# Patient Record
Sex: MALE | Race: WHITE | NOT HISPANIC OR LATINO
[De-identification: names, ages, dates, MRNs, and addresses within clinical notes are randomized per-mention and may not be internally consistent; named-entity substitution may affect disease eponyms.]

---

## 2017-03-06 ENCOUNTER — APPOINTMENT (OUTPATIENT)
Dept: SURGERY | Facility: CLINIC | Age: 57
End: 2017-03-06

## 2017-03-06 VITALS
SYSTOLIC BLOOD PRESSURE: 146 MMHG | RESPIRATION RATE: 16 BRPM | HEART RATE: 76 BPM | OXYGEN SATURATION: 98 % | BODY MASS INDEX: 33.47 KG/M2 | WEIGHT: 226 LBS | TEMPERATURE: 98 F | HEIGHT: 69 IN | DIASTOLIC BLOOD PRESSURE: 98 MMHG

## 2017-03-06 DIAGNOSIS — Z83.3 FAMILY HISTORY OF DIABETES MELLITUS: ICD-10-CM

## 2017-03-06 DIAGNOSIS — E66.9 OBESITY, UNSPECIFIED: ICD-10-CM

## 2017-03-06 DIAGNOSIS — Z82.49 FAMILY HISTORY OF ISCHEMIC HEART DISEASE AND OTHER DISEASES OF THE CIRCULATORY SYSTEM: ICD-10-CM

## 2017-03-06 DIAGNOSIS — Z87.442 PERSONAL HISTORY OF URINARY CALCULI: ICD-10-CM

## 2017-03-06 DIAGNOSIS — R63.5 ABNORMAL WEIGHT GAIN: ICD-10-CM

## 2017-03-06 RX ORDER — RIVAROXABAN 10 MG/1
10 TABLET, FILM COATED ORAL
Refills: 0 | Status: ACTIVE | COMMUNITY

## 2017-03-06 RX ORDER — MULTIVIT-MINS/IRON/FOLIC/LYCOP 8-200-600
TABLET ORAL
Refills: 0 | Status: ACTIVE | COMMUNITY

## 2017-03-06 RX ORDER — AZILSARTAN KAMEDOXOMIL 40 MG/1
40 TABLET ORAL
Refills: 0 | Status: ACTIVE | COMMUNITY

## 2018-02-26 ENCOUNTER — APPOINTMENT (OUTPATIENT)
Dept: ORTHOPEDIC SURGERY | Facility: CLINIC | Age: 58
End: 2018-02-26
Payer: COMMERCIAL

## 2018-02-26 VITALS
WEIGHT: 220 LBS | DIASTOLIC BLOOD PRESSURE: 85 MMHG | HEIGHT: 69 IN | SYSTOLIC BLOOD PRESSURE: 133 MMHG | HEART RATE: 66 BPM | BODY MASS INDEX: 32.58 KG/M2

## 2018-02-26 DIAGNOSIS — M75.82 OTHER SHOULDER LESIONS, LEFT SHOULDER: ICD-10-CM

## 2018-02-26 PROCEDURE — 20610 DRAIN/INJ JOINT/BURSA W/O US: CPT | Mod: LT

## 2018-02-26 PROCEDURE — 99244 OFF/OP CNSLTJ NEW/EST MOD 40: CPT | Mod: 25

## 2018-02-26 PROCEDURE — 73030 X-RAY EXAM OF SHOULDER: CPT | Mod: LT

## 2018-03-21 ENCOUNTER — APPOINTMENT (OUTPATIENT)
Dept: OTOLARYNGOLOGY | Facility: CLINIC | Age: 58
End: 2018-03-21

## 2020-12-23 ENCOUNTER — APPOINTMENT (OUTPATIENT)
Dept: OTOLARYNGOLOGY | Facility: CLINIC | Age: 60
End: 2020-12-23
Payer: COMMERCIAL

## 2020-12-23 VITALS
HEART RATE: 74 BPM | TEMPERATURE: 97.7 F | WEIGHT: 207 LBS | HEIGHT: 69 IN | BODY MASS INDEX: 30.66 KG/M2 | SYSTOLIC BLOOD PRESSURE: 130 MMHG | DIASTOLIC BLOOD PRESSURE: 83 MMHG

## 2020-12-23 DIAGNOSIS — H92.02 OTALGIA, LEFT EAR: ICD-10-CM

## 2020-12-23 DIAGNOSIS — H61.22 IMPACTED CERUMEN, LEFT EAR: ICD-10-CM

## 2020-12-23 DIAGNOSIS — H93.12 TINNITUS, LEFT EAR: ICD-10-CM

## 2020-12-23 DIAGNOSIS — K21.9 GASTRO-ESOPHAGEAL REFLUX DISEASE W/OUT ESOPHAGITIS: ICD-10-CM

## 2020-12-23 DIAGNOSIS — T16.2XXA FOREIGN BODY IN LEFT EAR, INITIAL ENCOUNTER: ICD-10-CM

## 2020-12-23 PROCEDURE — 69200 CLEAR OUTER EAR CANAL: CPT | Mod: LT

## 2020-12-23 PROCEDURE — 99072 ADDL SUPL MATRL&STAF TM PHE: CPT

## 2020-12-23 PROCEDURE — 99204 OFFICE O/P NEW MOD 45 MIN: CPT | Mod: 25

## 2020-12-23 PROCEDURE — 69210 REMOVE IMPACTED EAR WAX UNI: CPT | Mod: RT

## 2020-12-23 NOTE — PHYSICAL EXAM
[Midline] : trachea located in midline position [Normal] : no rashes [de-identified] : b/l wax  and left ear cotton foreign body

## 2020-12-23 NOTE — HISTORY OF PRESENT ILLNESS
[Hearing Loss] : hearing loss [No] : patient does not have a  history of radiation therapy [de-identified] : 59 yo male\par Patient complains that he went swimming last week, got water in his left ear since then has pain. States he cant hear from left ear with a constant tinnitus. Spoke with his PCP, she rx Augmentin, didn't  help with pain so she then recommended that he put a hot pack on the jaw joint. States ear pain has slightly resolved. Continues to have hearing loss and tinnitus. \par Pt has no ear pain, ear drainage, vertigo, nasal congestion, nasal discharge, epistaxis, sinus infections, facial pain, facial pressure, throat pain, dysphagia or fevers\par \par  [Anxiety] : no anxiety [Dizziness] : no dizziness [Headache] : no headache [Recurrent Otitis Media] : no recurrent otitis media [Otitis Media with Effusion] : no otitis media with effusion [Presbycusis] : no presbycusis [Congenital Ear Malformation] : no congenital ear malformation [Meniere Disease] : no Meniere disease [Otosclerosis] : no otosclerosis [Perilymphatic Fistula] : no perilymphatic fistula [Hypertension] : no hypertension [Loud Noise Exposure] : no history of loud noise exposure [Smoking] : no smoking [Early Onset Hearing Loss] : no early onset hearing loss [Stroke] : no stroke [Facial Pain] : no facial pain [Facial Pressure] : no facial pressure [Nasal Congestion] : no nasal congestion [Diplopia] : no diplopia [Ear Fullness] : no ear fullness [Allergic Rhinitis] : no allergic rhinitis [Maxillary Tooth Infection] : no maxillary tooth infection [Septal Deviation] : no septal deviation [Environmental Allergies] : no environmental allergies [Seasonal Allergies] : no seasonal allergies [Environmental Allergens] : no environmental allergens [Adenoidectomy] : no adenoidectomy [Nasal FB Removal] : no nasal foreign body removal [Allergies] : no allergies [Asthma] : no asthma [Neck Mass] : no neck mass [Neck Pain] : no neck pain [Chills] : no chills [Cold Intolerance] : no cold intolerance [Cough] : no cough [Fatigue] : no fatigue [Heat Intolerance] : no heat intolerance [Hyperthyroidism] : no hyperthyroidism [Sialadenitis] : no sialadenitis [Hodgkin Disease] : no hodgkin disease [Non-Hodgkin Lymphoma] : no non-hodgkin lymphoma [Tobacco Use] : no tobacco use [Alcohol Use] : no alcohol use [Graves Disease] : no graves disease [Thyroid Cancer] : no thyroid cancer

## 2020-12-23 NOTE — ASSESSMENT
[FreeTextEntry1] : Patient complains of left sided ear pain and hearing loss x 1 week \par Wax:\par -Cerumen is removed from the right and left  ear canal with a curette and suction.\par -Left ear cotton ball removed \par -Rx:Debrox be placed in both ears on a routine basis to keep them free of wax.\par -Routine debridement suggested to keep the ears free of wax.\par -Hearing improved with wax removal \par d/u q-tips usage\par \par f/u prn

## 2020-12-23 NOTE — PROCEDURE
[Risk and Benefits Discussed] : The purpose, risks, discomforts, benefits and alternatives of the procedure have been explained to the patient including no treatment. [Cerumen Impaction] : Cerumen Impaction [Same] : same as the Pre Op Dx. [] : Removal of Cerumen [FreeTextEntry1] : left EAC foreign body [FreeTextEntry6] : wax removed \par wax removed bilat\par Cotton ball foreign body removed from left eac\par the microscope was necessary for illumination and magnification\par

## 2024-06-05 ENCOUNTER — NON-APPOINTMENT (OUTPATIENT)
Age: 64
End: 2024-06-05

## 2024-06-10 ENCOUNTER — TRANSCRIPTION ENCOUNTER (OUTPATIENT)
Age: 64
End: 2024-06-10

## 2024-06-10 ENCOUNTER — OUTPATIENT (OUTPATIENT)
Dept: OUTPATIENT SERVICES | Facility: HOSPITAL | Age: 64
LOS: 1 days | End: 2024-06-10
Payer: COMMERCIAL

## 2024-06-10 ENCOUNTER — NON-APPOINTMENT (OUTPATIENT)
Age: 64
End: 2024-06-10

## 2024-06-10 VITALS
OXYGEN SATURATION: 98 % | SYSTOLIC BLOOD PRESSURE: 153 MMHG | HEIGHT: 69 IN | HEART RATE: 72 BPM | WEIGHT: 203.93 LBS | TEMPERATURE: 98 F | DIASTOLIC BLOOD PRESSURE: 93 MMHG | RESPIRATION RATE: 17 BRPM

## 2024-06-10 VITALS
OXYGEN SATURATION: 96 % | HEART RATE: 85 BPM | DIASTOLIC BLOOD PRESSURE: 93 MMHG | SYSTOLIC BLOOD PRESSURE: 144 MMHG | RESPIRATION RATE: 15 BRPM

## 2024-06-10 DIAGNOSIS — I48.3 TYPICAL ATRIAL FLUTTER: ICD-10-CM

## 2024-06-10 LAB
ANION GAP SERPL CALC-SCNC: 12 MMOL/L — SIGNIFICANT CHANGE UP (ref 5–17)
BUN SERPL-MCNC: 20 MG/DL — SIGNIFICANT CHANGE UP (ref 7–23)
CALCIUM SERPL-MCNC: 9.7 MG/DL — SIGNIFICANT CHANGE UP (ref 8.4–10.5)
CHLORIDE SERPL-SCNC: 108 MMOL/L — SIGNIFICANT CHANGE UP (ref 96–108)
CO2 SERPL-SCNC: 25 MMOL/L — SIGNIFICANT CHANGE UP (ref 22–31)
CREAT SERPL-MCNC: 1.43 MG/DL — HIGH (ref 0.5–1.3)
EGFR: 55 ML/MIN/1.73M2 — LOW
GLUCOSE BLDC GLUCOMTR-MCNC: 144 MG/DL — HIGH (ref 70–99)
GLUCOSE SERPL-MCNC: 152 MG/DL — HIGH (ref 70–99)
HCT VFR BLD CALC: 46.3 % — SIGNIFICANT CHANGE UP (ref 39–50)
HGB BLD-MCNC: 14 G/DL — SIGNIFICANT CHANGE UP (ref 13–17)
MCHC RBC-ENTMCNC: 25.9 PG — LOW (ref 27–34)
MCHC RBC-ENTMCNC: 30.2 GM/DL — LOW (ref 32–36)
MCV RBC AUTO: 85.7 FL — SIGNIFICANT CHANGE UP (ref 80–100)
NRBC # BLD: 0 /100 WBCS — SIGNIFICANT CHANGE UP (ref 0–0)
PLATELET # BLD AUTO: 137 K/UL — LOW (ref 150–400)
POTASSIUM SERPL-MCNC: 4.1 MMOL/L — SIGNIFICANT CHANGE UP (ref 3.5–5.3)
POTASSIUM SERPL-SCNC: 4.1 MMOL/L — SIGNIFICANT CHANGE UP (ref 3.5–5.3)
RBC # BLD: 5.4 M/UL — SIGNIFICANT CHANGE UP (ref 4.2–5.8)
RBC # FLD: 13.5 % — SIGNIFICANT CHANGE UP (ref 10.3–14.5)
SODIUM SERPL-SCNC: 145 MMOL/L — SIGNIFICANT CHANGE UP (ref 135–145)
WBC # BLD: 5.7 K/UL — SIGNIFICANT CHANGE UP (ref 3.8–10.5)
WBC # FLD AUTO: 5.7 K/UL — SIGNIFICANT CHANGE UP (ref 3.8–10.5)

## 2024-06-10 PROCEDURE — 82962 GLUCOSE BLOOD TEST: CPT

## 2024-06-10 PROCEDURE — 93005 ELECTROCARDIOGRAM TRACING: CPT

## 2024-06-10 PROCEDURE — 80048 BASIC METABOLIC PNL TOTAL CA: CPT

## 2024-06-10 PROCEDURE — 93010 ELECTROCARDIOGRAM REPORT: CPT | Mod: 76

## 2024-06-10 PROCEDURE — 36415 COLL VENOUS BLD VENIPUNCTURE: CPT

## 2024-06-10 PROCEDURE — 92960 CARDIOVERSION ELECTRIC EXT: CPT

## 2024-06-10 PROCEDURE — 85027 COMPLETE CBC AUTOMATED: CPT

## 2024-06-10 RX ORDER — SEMAGLUTIDE 0.68 MG/ML
0.5 INJECTION, SOLUTION SUBCUTANEOUS
Refills: 0 | DISCHARGE

## 2024-06-10 RX ORDER — METFORMIN HYDROCHLORIDE 850 MG/1
1 TABLET ORAL
Refills: 0 | DISCHARGE

## 2024-06-10 RX ORDER — ATORVASTATIN CALCIUM 80 MG/1
1 TABLET, FILM COATED ORAL
Refills: 0 | DISCHARGE

## 2024-06-10 RX ORDER — TAMSULOSIN HYDROCHLORIDE 0.4 MG/1
1 CAPSULE ORAL
Refills: 0 | DISCHARGE

## 2024-06-10 RX ORDER — METOPROLOL TARTRATE 50 MG
1 TABLET ORAL
Refills: 0 | DISCHARGE

## 2024-06-10 RX ORDER — RIVAROXABAN 15 MG-20MG
1 KIT ORAL
Refills: 0 | DISCHARGE

## 2024-06-10 RX ORDER — AZILSARTAN KAMEDOXOMIL AND CHLORTHALIDONE 40; 12.5 MG/1; MG/1
1 TABLET ORAL
Refills: 0 | DISCHARGE

## 2024-06-10 RX ORDER — EMPAGLIFLOZIN 10 MG/1
1 TABLET, FILM COATED ORAL
Refills: 0 | DISCHARGE

## 2024-06-10 NOTE — ASU DISCHARGE PLAN (ADULT/PEDIATRIC) - NS MD DC FALL RISK RISK
For information on Fall & Injury Prevention, visit: [email or web address] OR  [email or web address] OR  https://www.cdc.gov/steadi/patient.html

## 2024-06-10 NOTE — H&P CARDIOLOGY - HISTORY OF PRESENT ILLNESS
64 yrs Male with PMHx DMT2, HTN, Afib. Pt presents today for DCCV.  64 yrs Male with PMHx DMT2, HTN, Afib.HLD presents to [facility] Dr [facility]. Pt was put on Holter monitor which showed Afib.  Pt presents today for DCCV.  64 yrs Male with PMHx DMT2, HTN, Afib (on Xarelto last dose 6/10/24),.HLD presents to [facility] Dr [facility]. Pt was put on Holter monitor which showed Afib. Pt presents today for DCCV.

## 2024-06-10 NOTE — ASU DISCHARGE PLAN (ADULT/PEDIATRIC) - CARE PROVIDER_API CALL
Bob Silver  Cardiovascular Disease  [address] [facility] [address]  [place] [address]  Phone: [phone]  Fax: [phone]  Follow Up Time: Routine

## 2024-06-10 NOTE — H&P CARDIOLOGY - NSICDXPASTSURGICALHX_GEN_ALL_CORE_FT
PAST SURGICAL HISTORY:  Kidney calculus 2012 - left - surgical removal    S/P cataract surgery 2011 - bilateral eyes

## 2024-06-10 NOTE — PRE-ANESTHESIA EVALUATION ADULT - NSANTHPMHFT_GEN_ALL_CORE
63 yo M w/ PMHx of DMT2, HTN, Afib (on Xarelto last dose 6/10/24), HLD, SAYRA (non-compliant) presents to [facility]. Pt was put on Holter monitor which showed Afib. Pt presents today for DCCV.    Denies active CP/SOB/Orthopnea  Denies hx of MI/CHF

## 2024-06-10 NOTE — H&P CARDIOLOGY - NSICDXPASTMEDICALHX_GEN_ALL_CORE_FT
PAST MEDICAL HISTORY:  Atrial fibrillation     Diabetes mellitus     Hypertension     Hypertriglyceridemia     Nephrolithiasis     Obesity (BMI 30-39.9)     Sleep apnea

## 2024-10-23 ENCOUNTER — OUTPATIENT (OUTPATIENT)
Dept: OUTPATIENT SERVICES | Facility: HOSPITAL | Age: 64
LOS: 1 days | End: 2024-10-23
Payer: COMMERCIAL

## 2024-10-23 VITALS
TEMPERATURE: 99 F | HEART RATE: 82 BPM | DIASTOLIC BLOOD PRESSURE: 88 MMHG | OXYGEN SATURATION: 98 % | HEIGHT: 70 IN | WEIGHT: 214.95 LBS | RESPIRATION RATE: 14 BRPM | SYSTOLIC BLOOD PRESSURE: 134 MMHG

## 2024-10-23 DIAGNOSIS — Z98.890 OTHER SPECIFIED POSTPROCEDURAL STATES: Chronic | ICD-10-CM

## 2024-10-23 DIAGNOSIS — I48.91 UNSPECIFIED ATRIAL FIBRILLATION: ICD-10-CM

## 2024-10-23 DIAGNOSIS — Z90.3 ACQUIRED ABSENCE OF STOMACH [PART OF]: Chronic | ICD-10-CM

## 2024-10-23 DIAGNOSIS — Z01.818 ENCOUNTER FOR OTHER PREPROCEDURAL EXAMINATION: ICD-10-CM

## 2024-10-23 LAB
A1C WITH ESTIMATED AVERAGE GLUCOSE RESULT: 7.6 % — HIGH (ref 4–5.6)
ALBUMIN SERPL ELPH-MCNC: 4.2 G/DL — SIGNIFICANT CHANGE UP (ref 3.3–5)
ALP SERPL-CCNC: 68 U/L — SIGNIFICANT CHANGE UP (ref 40–120)
ALT FLD-CCNC: 20 U/L — SIGNIFICANT CHANGE UP (ref 10–45)
ANION GAP SERPL CALC-SCNC: 16 MMOL/L — SIGNIFICANT CHANGE UP (ref 5–17)
AST SERPL-CCNC: 17 U/L — SIGNIFICANT CHANGE UP (ref 10–40)
BILIRUB SERPL-MCNC: 0.4 MG/DL — SIGNIFICANT CHANGE UP (ref 0.2–1.2)
BLD GP AB SCN SERPL QL: NEGATIVE — SIGNIFICANT CHANGE UP
BUN SERPL-MCNC: 23 MG/DL — SIGNIFICANT CHANGE UP (ref 7–23)
CALCIUM SERPL-MCNC: 9.5 MG/DL — SIGNIFICANT CHANGE UP (ref 8.4–10.5)
CHLORIDE SERPL-SCNC: 101 MMOL/L — SIGNIFICANT CHANGE UP (ref 96–108)
CO2 SERPL-SCNC: 21 MMOL/L — LOW (ref 22–31)
CREAT SERPL-MCNC: 1.25 MG/DL — SIGNIFICANT CHANGE UP (ref 0.5–1.3)
EGFR: 65 ML/MIN/1.73M2 — SIGNIFICANT CHANGE UP
ESTIMATED AVERAGE GLUCOSE: 171 MG/DL — HIGH (ref 68–114)
GLUCOSE SERPL-MCNC: 349 MG/DL — HIGH (ref 70–99)
HCT VFR BLD CALC: 45 % — SIGNIFICANT CHANGE UP (ref 39–50)
HGB BLD-MCNC: 14.3 G/DL — SIGNIFICANT CHANGE UP (ref 13–17)
MCHC RBC-ENTMCNC: 27.8 PG — SIGNIFICANT CHANGE UP (ref 27–34)
MCHC RBC-ENTMCNC: 31.8 GM/DL — LOW (ref 32–36)
MCV RBC AUTO: 87.4 FL — SIGNIFICANT CHANGE UP (ref 80–100)
NRBC # BLD: 0 /100 WBCS — SIGNIFICANT CHANGE UP (ref 0–0)
PLATELET # BLD AUTO: 177 K/UL — SIGNIFICANT CHANGE UP (ref 150–400)
POTASSIUM SERPL-MCNC: 4 MMOL/L — SIGNIFICANT CHANGE UP (ref 3.5–5.3)
POTASSIUM SERPL-SCNC: 4 MMOL/L — SIGNIFICANT CHANGE UP (ref 3.5–5.3)
PROT SERPL-MCNC: 7.5 G/DL — SIGNIFICANT CHANGE UP (ref 6–8.3)
RBC # BLD: 5.15 M/UL — SIGNIFICANT CHANGE UP (ref 4.2–5.8)
RBC # FLD: 12.9 % — SIGNIFICANT CHANGE UP (ref 10.3–14.5)
RH IG SCN BLD-IMP: POSITIVE — SIGNIFICANT CHANGE UP
SODIUM SERPL-SCNC: 138 MMOL/L — SIGNIFICANT CHANGE UP (ref 135–145)
WBC # BLD: 5.08 K/UL — SIGNIFICANT CHANGE UP (ref 3.8–10.5)
WBC # FLD AUTO: 5.08 K/UL — SIGNIFICANT CHANGE UP (ref 3.8–10.5)

## 2024-10-23 PROCEDURE — 85027 COMPLETE CBC AUTOMATED: CPT

## 2024-10-23 PROCEDURE — G0463: CPT

## 2024-10-23 PROCEDURE — 80053 COMPREHEN METABOLIC PANEL: CPT

## 2024-10-23 PROCEDURE — 86901 BLOOD TYPING SEROLOGIC RH(D): CPT

## 2024-10-23 PROCEDURE — 83036 HEMOGLOBIN GLYCOSYLATED A1C: CPT

## 2024-10-23 PROCEDURE — 36415 COLL VENOUS BLD VENIPUNCTURE: CPT

## 2024-10-23 PROCEDURE — 86900 BLOOD TYPING SEROLOGIC ABO: CPT

## 2024-10-23 PROCEDURE — 86850 RBC ANTIBODY SCREEN: CPT

## 2024-10-23 RX ORDER — POTASSIUM CITRATE 10 MEQ/1
15 TABLET, EXTENDED RELEASE ORAL
Refills: 0 | DISCHARGE

## 2024-10-23 RX ORDER — ASPIRIN/MAG CARB/ALUMINUM AMIN 325 MG
0 TABLET ORAL
Refills: 0 | DISCHARGE

## 2024-10-23 RX ORDER — FENOFIBRATE 145 MG/1
1 TABLET, FILM COATED ORAL
Refills: 0 | DISCHARGE

## 2024-10-23 NOTE — H&P PST ADULT - NEGATIVE NEUROLOGICAL SYMPTOMS
no weakness/no paresthesias/no generalized seizures/no focal seizures/no syncope/no vertigo/no difficulty walking

## 2024-10-23 NOTE — H&P PST ADULT - PROBLEM SELECTOR PLAN 1
AF Ablation  -cbc, bmp, A1c, type and screen @ PST  -preop instructions discussed  -per redcap --no interruption in A/C  -ABO and fingerstick on admit AF Ablation  -cbc, bmp, A1c, type and screen @ PST  -preop instructions discussed  -per redcap --no interruption in A/C  -instructed to hold ozempic 1 week preop   -hold jardiance 3 days preop  -hold metformin day of procedure  -ABO and fingerstick on admit

## 2024-10-23 NOTE — H&P PST ADULT - NSICDXPASTSURGICALHX_GEN_ALL_CORE_FT
PAST SURGICAL HISTORY:  H/O circumcision     H/O colonoscopy     H/O endoscopy     H/O gastric sleeve     H/O lithotripsy     H/O right inguinal hernia repair     Kidney calculus 2012 - left - surgical removal    S/P cataract surgery 2011 - bilateral eyes

## 2024-10-23 NOTE — H&P PST ADULT - CONSTITUTIONAL
well-groomed/no distress Peng Advancement Flap Text: The defect edges were debeveled with a #15 scalpel blade.  Given the location of the defect, shape of the defect and the proximity to free margins a Peng advancement flap was deemed most appropriate.  Using a sterile surgical marker, an appropriate advancement flap was drawn incorporating the defect and placing the expected incisions within the relaxed skin tension lines where possible. The area thus outlined was incised deep to adipose tissue with a #15 scalpel blade.  The skin margins were undermined to an appropriate distance in all directions utilizing iris scissors.

## 2024-10-23 NOTE — H&P PST ADULT - HISTORY OF PRESENT ILLNESS
64 year old male PMH of HTN, T2D, Morbid Obesity, S/P Sleeve Gastrectomy in 2013, SAYRA, (states no longer uses CPAP since 50lb weight loss), and  AF diagnosed 15 years ago (on xarelto) who presents to [facility] prior to AF Ablation on 11/4/2024. Patient endorses that he underwent S/P DCCV in june, now with recurrence. He denies fever?   64 year old male PMH of HTN, T2D, Morbid Obesity, S/P Sleeve Gastrectomy in 2013, SAYRA, (states no longer uses CPAP since 50lb weight loss), and  AF diagnosed 15 years ago (on xarelto) who presents to [facility] prior to AF Ablation on 11/4/2024. Patient endorses that he underwent S/P DCCV in june, now with recurrence. He denies SOB, ROWELL, chest pain, palpitations, dizziness, lightheadedness, syncope, presyncope, fever, chills, nausea, vomiting or abdominal pain.     64 year old male PMH of HTN, T2D, Morbid Obesity, S/P Sleeve Gastrectomy in 2013, SAYRA, (states no longer uses CPAP since 50lb weight loss), and  AF diagnosed 15 years ago (on xarelto) who presents to [facility] prior to AF Ablation on 11/4/2024. Patient endorses that he underwent S/P Cardioversion in June. He denies SOB, ROWELL, chest pain, palpitations, dizziness, lightheadedness, syncope, presyncope, fever, chills, nausea, vomiting or abdominal pain.

## 2024-10-30 RX ORDER — EMPAGLIFLOZIN 10 MG/1
10 TABLET, FILM COATED ORAL
Refills: 0 | Status: ACTIVE | COMMUNITY

## 2024-10-30 RX ORDER — METOPROLOL SUCCINATE 50 MG/1
50 TABLET, EXTENDED RELEASE ORAL
Refills: 0 | Status: ACTIVE | COMMUNITY

## 2024-10-30 RX ORDER — REPAGLINIDE 1 MG/1
TABLET ORAL
Refills: 0 | Status: ACTIVE | COMMUNITY

## 2024-10-31 LAB
ANION GAP SERPL CALC-SCNC: 14 MMOL/L
BUN SERPL-MCNC: 18 MG/DL
CALCIUM SERPL-MCNC: 9.3 MG/DL
CHLORIDE SERPL-SCNC: 106 MMOL/L
CO2 SERPL-SCNC: 22 MMOL/L
CREAT SERPL-MCNC: 1.45 MG/DL
EGFR: 54 ML/MIN/1.73M2
GLUCOSE SERPL-MCNC: 167 MG/DL
POTASSIUM SERPL-SCNC: 4 MMOL/L
SODIUM SERPL-SCNC: 142 MMOL/L

## 2024-11-04 ENCOUNTER — INPATIENT (INPATIENT)
Facility: HOSPITAL | Age: 64
LOS: 0 days | Discharge: HOME | DRG: 310 | End: 2024-11-05
Attending: STUDENT IN AN ORGANIZED HEALTH CARE EDUCATION/TRAINING PROGRAM | Admitting: INTERNAL MEDICINE
Payer: COMMERCIAL

## 2024-11-04 VITALS
DIASTOLIC BLOOD PRESSURE: 96 MMHG | TEMPERATURE: 98 F | WEIGHT: 210.1 LBS | OXYGEN SATURATION: 95 % | SYSTOLIC BLOOD PRESSURE: 152 MMHG | HEIGHT: 69 IN | HEART RATE: 72 BPM | RESPIRATION RATE: 16 BRPM

## 2024-11-04 DIAGNOSIS — Z98.890 OTHER SPECIFIED POSTPROCEDURAL STATES: Chronic | ICD-10-CM

## 2024-11-04 DIAGNOSIS — I48.91 UNSPECIFIED ATRIAL FIBRILLATION: ICD-10-CM

## 2024-11-04 DIAGNOSIS — Z90.3 ACQUIRED ABSENCE OF STOMACH [PART OF]: Chronic | ICD-10-CM

## 2024-11-04 LAB
ALBUMIN SERPL ELPH-MCNC: 3.8 G/DL — SIGNIFICANT CHANGE UP (ref 3.3–5)
ALP SERPL-CCNC: 57 U/L — SIGNIFICANT CHANGE UP (ref 40–120)
ALT FLD-CCNC: 19 U/L — SIGNIFICANT CHANGE UP (ref 10–45)
ANION GAP SERPL CALC-SCNC: 13 MMOL/L — SIGNIFICANT CHANGE UP (ref 5–17)
APTT BLD: 30.3 SEC — SIGNIFICANT CHANGE UP (ref 24.5–35.6)
AST SERPL-CCNC: 34 U/L — SIGNIFICANT CHANGE UP (ref 10–40)
BASOPHILS # BLD AUTO: 0.1 K/UL — SIGNIFICANT CHANGE UP (ref 0–0.2)
BASOPHILS NFR BLD AUTO: 0.9 % — SIGNIFICANT CHANGE UP (ref 0–2)
BILIRUB SERPL-MCNC: 0.9 MG/DL — SIGNIFICANT CHANGE UP (ref 0.2–1.2)
BLD GP AB SCN SERPL QL: NEGATIVE — SIGNIFICANT CHANGE UP
BUN SERPL-MCNC: 14 MG/DL — SIGNIFICANT CHANGE UP (ref 7–23)
CALCIUM SERPL-MCNC: 8.3 MG/DL — LOW (ref 8.4–10.5)
CHLORIDE SERPL-SCNC: 111 MMOL/L — HIGH (ref 96–108)
CO2 SERPL-SCNC: 19 MMOL/L — LOW (ref 22–31)
CREAT SERPL-MCNC: 1.18 MG/DL — SIGNIFICANT CHANGE UP (ref 0.5–1.3)
EGFR: 69 ML/MIN/1.73M2 — SIGNIFICANT CHANGE UP
EOSINOPHIL # BLD AUTO: 0 K/UL — SIGNIFICANT CHANGE UP (ref 0–0.5)
EOSINOPHIL NFR BLD AUTO: 0 % — SIGNIFICANT CHANGE UP (ref 0–6)
GLUCOSE BLDC GLUCOMTR-MCNC: 142 MG/DL — HIGH (ref 70–99)
GLUCOSE BLDC GLUCOMTR-MCNC: 211 MG/DL — HIGH (ref 70–99)
GLUCOSE BLDC GLUCOMTR-MCNC: 297 MG/DL — HIGH (ref 70–99)
GLUCOSE SERPL-MCNC: 234 MG/DL — HIGH (ref 70–99)
HCT VFR BLD CALC: 44.2 % — SIGNIFICANT CHANGE UP (ref 39–50)
HGB BLD-MCNC: 13.6 G/DL — SIGNIFICANT CHANGE UP (ref 13–17)
LYMPHOCYTES # BLD AUTO: 0.27 K/UL — LOW (ref 1–3.3)
LYMPHOCYTES # BLD AUTO: 2.6 % — LOW (ref 13–44)
MAGNESIUM SERPL-MCNC: 1.9 MG/DL — SIGNIFICANT CHANGE UP (ref 1.6–2.6)
MCHC RBC-ENTMCNC: 27.3 PG — SIGNIFICANT CHANGE UP (ref 27–34)
MCHC RBC-ENTMCNC: 30.8 G/DL — LOW (ref 32–36)
MCV RBC AUTO: 88.8 FL — SIGNIFICANT CHANGE UP (ref 80–100)
MONOCYTES # BLD AUTO: 0.18 K/UL — SIGNIFICANT CHANGE UP (ref 0–0.9)
MONOCYTES NFR BLD AUTO: 1.7 % — LOW (ref 2–14)
NEUTROPHILS # BLD AUTO: 10.02 K/UL — HIGH (ref 1.8–7.4)
NEUTROPHILS NFR BLD AUTO: 92.2 % — HIGH (ref 43–77)
PHOSPHATE SERPL-MCNC: 3.3 MG/DL — SIGNIFICANT CHANGE UP (ref 2.5–4.5)
PLATELET # BLD AUTO: 139 K/UL — LOW (ref 150–400)
POTASSIUM SERPL-MCNC: 4.4 MMOL/L — SIGNIFICANT CHANGE UP (ref 3.5–5.3)
POTASSIUM SERPL-SCNC: 4.4 MMOL/L — SIGNIFICANT CHANGE UP (ref 3.5–5.3)
PROT SERPL-MCNC: 6.8 G/DL — SIGNIFICANT CHANGE UP (ref 6–8.3)
RBC # BLD: 4.98 M/UL — SIGNIFICANT CHANGE UP (ref 4.2–5.8)
RBC # FLD: 13.1 % — SIGNIFICANT CHANGE UP (ref 10.3–14.5)
RH IG SCN BLD-IMP: POSITIVE — SIGNIFICANT CHANGE UP
SODIUM SERPL-SCNC: 143 MMOL/L — SIGNIFICANT CHANGE UP (ref 135–145)
WBC # BLD: 10.57 K/UL — HIGH (ref 3.8–10.5)
WBC # FLD AUTO: 10.57 K/UL — HIGH (ref 3.8–10.5)

## 2024-11-04 PROCEDURE — 93583 PERQ TRANSCATH SEPTAL REDUXN: CPT | Mod: 22

## 2024-11-04 PROCEDURE — 93656 COMPRE EP EVAL ABLTJ ATR FIB: CPT

## 2024-11-04 PROCEDURE — 93657 TX L/R ATRIAL FIB ADDL: CPT

## 2024-11-04 PROCEDURE — 93010 ELECTROCARDIOGRAM REPORT: CPT | Mod: 76

## 2024-11-04 PROCEDURE — 93655 ICAR CATH ABLTJ DSCRT ARRHYT: CPT

## 2024-11-04 PROCEDURE — 93623 PRGRMD STIMJ&PACG IV RX NFS: CPT | Mod: 26

## 2024-11-04 RX ORDER — INSULIN LISPRO 100/ML
VIAL (ML) SUBCUTANEOUS AT BEDTIME
Refills: 0 | Status: DISCONTINUED | OUTPATIENT
Start: 2024-11-04 | End: 2024-11-05

## 2024-11-04 RX ORDER — CHLORHEXIDINE GLUCONATE 40 MG/ML
1 SOLUTION TOPICAL DAILY
Refills: 0 | Status: DISCONTINUED | OUTPATIENT
Start: 2024-11-04 | End: 2024-11-05

## 2024-11-04 RX ORDER — INSULIN LISPRO 100/ML
VIAL (ML) SUBCUTANEOUS
Refills: 0 | Status: DISCONTINUED | OUTPATIENT
Start: 2024-11-04 | End: 2024-11-05

## 2024-11-04 RX ORDER — INFLUENZ VIR VAC TV P-SURF2003 15MCG/.5ML
0.5 SYRINGE (ML) INTRAMUSCULAR ONCE
Refills: 0 | Status: DISCONTINUED | OUTPATIENT
Start: 2024-11-04 | End: 2024-11-05

## 2024-11-04 RX ORDER — ACETAMINOPHEN 500 MG
1000 TABLET ORAL ONCE
Refills: 0 | Status: COMPLETED | OUTPATIENT
Start: 2024-11-04 | End: 2024-11-04

## 2024-11-04 RX ORDER — TAMSULOSIN HCL 0.4 MG
0.4 CAPSULE ORAL AT BEDTIME
Refills: 0 | Status: DISCONTINUED | OUTPATIENT
Start: 2024-11-04 | End: 2024-11-05

## 2024-11-04 RX ORDER — INSULIN LISPRO 100/ML
VIAL (ML) SUBCUTANEOUS
Refills: 0 | Status: DISCONTINUED | OUTPATIENT
Start: 2024-11-04 | End: 2024-11-04

## 2024-11-04 RX ORDER — FENOFIBRATE 145 MG/1
145 TABLET, FILM COATED ORAL DAILY
Refills: 0 | Status: DISCONTINUED | OUTPATIENT
Start: 2024-11-04 | End: 2024-11-05

## 2024-11-04 RX ADMIN — Medication 1000 MILLIGRAM(S): at 21:27

## 2024-11-04 RX ADMIN — Medication 2: at 19:20

## 2024-11-04 RX ADMIN — Medication 10 MILLIGRAM(S): at 21:20

## 2024-11-04 RX ADMIN — Medication 400 MILLIGRAM(S): at 20:40

## 2024-11-04 RX ADMIN — Medication 0.4 MILLIGRAM(S): at 21:20

## 2024-11-04 RX ADMIN — Medication 2: at 21:20

## 2024-11-04 NOTE — PRE-ANESTHESIA EVALUATION ADULT - NSANTHPMHFT_GEN_ALL_CORE
Medical history and ROS reviewed  ET > 4 METS, no CP, no OB  h/o HTN, DM, morbid obesity s/p gastric sleeve, SAYRA (symptoms improved since gastric sleeve), pAfib (asymptomatic)

## 2024-11-04 NOTE — PRE-ANESTHESIA EVALUATION ADULT - HEART RATE (BEATS/MIN)
Patient called stating when they called him to schedule stress from order, it was incorrect as he cannot do the treadmill due to COPD.  He would like the corrected order placed so he can get that scheduled.  He also needs a refill of Rx furosemide (LASIX) 20 MG tablet  He does not need a return call.     73

## 2024-11-05 ENCOUNTER — TRANSCRIPTION ENCOUNTER (OUTPATIENT)
Age: 64
End: 2024-11-05

## 2024-11-05 VITALS
HEART RATE: 86 BPM | SYSTOLIC BLOOD PRESSURE: 155 MMHG | TEMPERATURE: 99 F | OXYGEN SATURATION: 92 % | RESPIRATION RATE: 20 BRPM | DIASTOLIC BLOOD PRESSURE: 90 MMHG

## 2024-11-05 DIAGNOSIS — I10 ESSENTIAL (PRIMARY) HYPERTENSION: ICD-10-CM

## 2024-11-05 DIAGNOSIS — E11.9 TYPE 2 DIABETES MELLITUS WITHOUT COMPLICATIONS: ICD-10-CM

## 2024-11-05 DIAGNOSIS — I48.19 OTHER PERSISTENT ATRIAL FIBRILLATION: ICD-10-CM

## 2024-11-05 DIAGNOSIS — G47.33 OBSTRUCTIVE SLEEP APNEA (ADULT) (PEDIATRIC): ICD-10-CM

## 2024-11-05 DIAGNOSIS — E78.5 HYPERLIPIDEMIA, UNSPECIFIED: ICD-10-CM

## 2024-11-05 DIAGNOSIS — T14.8XXA OTHER INJURY OF UNSPECIFIED BODY REGION, INITIAL ENCOUNTER: ICD-10-CM

## 2024-11-05 LAB
ALBUMIN SERPL ELPH-MCNC: 3.7 G/DL — SIGNIFICANT CHANGE UP (ref 3.3–5)
ALP SERPL-CCNC: 52 U/L — SIGNIFICANT CHANGE UP (ref 40–120)
ALT FLD-CCNC: 24 U/L — SIGNIFICANT CHANGE UP (ref 10–45)
ANION GAP SERPL CALC-SCNC: 14 MMOL/L — SIGNIFICANT CHANGE UP (ref 5–17)
AST SERPL-CCNC: 42 U/L — HIGH (ref 10–40)
BASOPHILS # BLD AUTO: 0.01 K/UL — SIGNIFICANT CHANGE UP (ref 0–0.2)
BASOPHILS # BLD AUTO: 0.04 K/UL — SIGNIFICANT CHANGE UP (ref 0–0.2)
BASOPHILS NFR BLD AUTO: 0.1 % — SIGNIFICANT CHANGE UP (ref 0–2)
BASOPHILS NFR BLD AUTO: 0.5 % — SIGNIFICANT CHANGE UP (ref 0–2)
BILIRUB SERPL-MCNC: 0.6 MG/DL — SIGNIFICANT CHANGE UP (ref 0.2–1.2)
BUN SERPL-MCNC: 17 MG/DL — SIGNIFICANT CHANGE UP (ref 7–23)
CALCIUM SERPL-MCNC: 8.5 MG/DL — SIGNIFICANT CHANGE UP (ref 8.4–10.5)
CHLORIDE SERPL-SCNC: 105 MMOL/L — SIGNIFICANT CHANGE UP (ref 96–108)
CO2 SERPL-SCNC: 18 MMOL/L — LOW (ref 22–31)
CREAT SERPL-MCNC: 1.26 MG/DL — SIGNIFICANT CHANGE UP (ref 0.5–1.3)
EGFR: 64 ML/MIN/1.73M2 — SIGNIFICANT CHANGE UP
EOSINOPHIL # BLD AUTO: 0 K/UL — SIGNIFICANT CHANGE UP (ref 0–0.5)
EOSINOPHIL # BLD AUTO: 0.06 K/UL — SIGNIFICANT CHANGE UP (ref 0–0.5)
EOSINOPHIL NFR BLD AUTO: 0 % — SIGNIFICANT CHANGE UP (ref 0–6)
EOSINOPHIL NFR BLD AUTO: 0.7 % — SIGNIFICANT CHANGE UP (ref 0–6)
GLUCOSE BLDC GLUCOMTR-MCNC: 172 MG/DL — HIGH (ref 70–99)
GLUCOSE BLDC GLUCOMTR-MCNC: 296 MG/DL — HIGH (ref 70–99)
GLUCOSE BLDC GLUCOMTR-MCNC: 316 MG/DL — HIGH (ref 70–99)
GLUCOSE BLDC GLUCOMTR-MCNC: 343 MG/DL — HIGH (ref 70–99)
GLUCOSE BLDC GLUCOMTR-MCNC: 382 MG/DL — HIGH (ref 70–99)
GLUCOSE SERPL-MCNC: 295 MG/DL — HIGH (ref 70–99)
HCT VFR BLD CALC: 42 % — SIGNIFICANT CHANGE UP (ref 39–50)
HCT VFR BLD CALC: 42.5 % — SIGNIFICANT CHANGE UP (ref 39–50)
HGB BLD-MCNC: 13 G/DL — SIGNIFICANT CHANGE UP (ref 13–17)
HGB BLD-MCNC: 13.1 G/DL — SIGNIFICANT CHANGE UP (ref 13–17)
IMM GRANULOCYTES NFR BLD AUTO: 0.4 % — SIGNIFICANT CHANGE UP (ref 0–0.9)
IMM GRANULOCYTES NFR BLD AUTO: 0.5 % — SIGNIFICANT CHANGE UP (ref 0–0.9)
LYMPHOCYTES # BLD AUTO: 0.62 K/UL — LOW (ref 1–3.3)
LYMPHOCYTES # BLD AUTO: 1.44 K/UL — SIGNIFICANT CHANGE UP (ref 1–3.3)
LYMPHOCYTES # BLD AUTO: 17.1 % — SIGNIFICANT CHANGE UP (ref 13–44)
LYMPHOCYTES # BLD AUTO: 6.6 % — LOW (ref 13–44)
MAGNESIUM SERPL-MCNC: 1.9 MG/DL — SIGNIFICANT CHANGE UP (ref 1.6–2.6)
MCHC RBC-ENTMCNC: 27.5 PG — SIGNIFICANT CHANGE UP (ref 27–34)
MCHC RBC-ENTMCNC: 27.9 PG — SIGNIFICANT CHANGE UP (ref 27–34)
MCHC RBC-ENTMCNC: 30.6 G/DL — LOW (ref 32–36)
MCHC RBC-ENTMCNC: 31.2 G/DL — LOW (ref 32–36)
MCV RBC AUTO: 89.6 FL — SIGNIFICANT CHANGE UP (ref 80–100)
MCV RBC AUTO: 89.9 FL — SIGNIFICANT CHANGE UP (ref 80–100)
MONOCYTES # BLD AUTO: 0.81 K/UL — SIGNIFICANT CHANGE UP (ref 0–0.9)
MONOCYTES # BLD AUTO: 0.82 K/UL — SIGNIFICANT CHANGE UP (ref 0–0.9)
MONOCYTES NFR BLD AUTO: 8.6 % — SIGNIFICANT CHANGE UP (ref 2–14)
MONOCYTES NFR BLD AUTO: 9.8 % — SIGNIFICANT CHANGE UP (ref 2–14)
NEUTROPHILS # BLD AUTO: 6 K/UL — SIGNIFICANT CHANGE UP (ref 1.8–7.4)
NEUTROPHILS # BLD AUTO: 7.9 K/UL — HIGH (ref 1.8–7.4)
NEUTROPHILS NFR BLD AUTO: 71.4 % — SIGNIFICANT CHANGE UP (ref 43–77)
NEUTROPHILS NFR BLD AUTO: 84.3 % — HIGH (ref 43–77)
NRBC # BLD: 0 /100 WBCS — SIGNIFICANT CHANGE UP (ref 0–0)
NRBC # BLD: 0 /100 WBCS — SIGNIFICANT CHANGE UP (ref 0–0)
PHOSPHATE SERPL-MCNC: 2.3 MG/DL — LOW (ref 2.5–4.5)
PLATELET # BLD AUTO: 141 K/UL — LOW (ref 150–400)
PLATELET # BLD AUTO: 143 K/UL — LOW (ref 150–400)
POTASSIUM SERPL-MCNC: 4.1 MMOL/L — SIGNIFICANT CHANGE UP (ref 3.5–5.3)
POTASSIUM SERPL-SCNC: 4.1 MMOL/L — SIGNIFICANT CHANGE UP (ref 3.5–5.3)
PROT SERPL-MCNC: 6.5 G/DL — SIGNIFICANT CHANGE UP (ref 6–8.3)
RBC # BLD: 4.69 M/UL — SIGNIFICANT CHANGE UP (ref 4.2–5.8)
RBC # BLD: 4.73 M/UL — SIGNIFICANT CHANGE UP (ref 4.2–5.8)
RBC # FLD: 13.2 % — SIGNIFICANT CHANGE UP (ref 10.3–14.5)
RBC # FLD: 13.2 % — SIGNIFICANT CHANGE UP (ref 10.3–14.5)
SODIUM SERPL-SCNC: 137 MMOL/L — SIGNIFICANT CHANGE UP (ref 135–145)
WBC # BLD: 8.4 K/UL — SIGNIFICANT CHANGE UP (ref 3.8–10.5)
WBC # BLD: 9.38 K/UL — SIGNIFICANT CHANGE UP (ref 3.8–10.5)
WBC # FLD AUTO: 8.4 K/UL — SIGNIFICANT CHANGE UP (ref 3.8–10.5)
WBC # FLD AUTO: 9.38 K/UL — SIGNIFICANT CHANGE UP (ref 3.8–10.5)

## 2024-11-05 PROCEDURE — 84100 ASSAY OF PHOSPHORUS: CPT

## 2024-11-05 PROCEDURE — C1759: CPT

## 2024-11-05 PROCEDURE — 99291 CRITICAL CARE FIRST HOUR: CPT | Mod: 25

## 2024-11-05 PROCEDURE — C1769: CPT

## 2024-11-05 PROCEDURE — C1732: CPT

## 2024-11-05 PROCEDURE — 78830 RP LOCLZJ TUM SPECT W/CT 1: CPT | Mod: MC

## 2024-11-05 PROCEDURE — 36415 COLL VENOUS BLD VENIPUNCTURE: CPT

## 2024-11-05 PROCEDURE — 93657 TX L/R ATRIAL FIB ADDL: CPT

## 2024-11-05 PROCEDURE — 93926 LOWER EXTREMITY STUDY: CPT | Mod: 26,RT

## 2024-11-05 PROCEDURE — A9538: CPT

## 2024-11-05 PROCEDURE — C1894: CPT

## 2024-11-05 PROCEDURE — 93005 ELECTROCARDIOGRAM TRACING: CPT

## 2024-11-05 PROCEDURE — 82962 GLUCOSE BLOOD TEST: CPT

## 2024-11-05 PROCEDURE — 86900 BLOOD TYPING SEROLOGIC ABO: CPT

## 2024-11-05 PROCEDURE — 93583 PERQ TRANSCATH SEPTAL REDUXN: CPT

## 2024-11-05 PROCEDURE — C1887: CPT

## 2024-11-05 PROCEDURE — 99239 HOSP IP/OBS DSCHRG MGMT >30: CPT

## 2024-11-05 PROCEDURE — C1766: CPT

## 2024-11-05 PROCEDURE — 93655 ICAR CATH ABLTJ DSCRT ARRHYT: CPT

## 2024-11-05 PROCEDURE — C1889: CPT

## 2024-11-05 PROCEDURE — 85730 THROMBOPLASTIN TIME PARTIAL: CPT

## 2024-11-05 PROCEDURE — 85025 COMPLETE CBC W/AUTO DIFF WBC: CPT

## 2024-11-05 PROCEDURE — 78830 RP LOCLZJ TUM SPECT W/CT 1: CPT | Mod: 26

## 2024-11-05 PROCEDURE — 93623 PRGRMD STIMJ&PACG IV RX NFS: CPT

## 2024-11-05 PROCEDURE — 93656 COMPRE EP EVAL ABLTJ ATR FIB: CPT

## 2024-11-05 PROCEDURE — 93010 ELECTROCARDIOGRAM REPORT: CPT

## 2024-11-05 PROCEDURE — C1730: CPT

## 2024-11-05 PROCEDURE — 86901 BLOOD TYPING SEROLOGIC RH(D): CPT

## 2024-11-05 PROCEDURE — C1725: CPT

## 2024-11-05 PROCEDURE — 80053 COMPREHEN METABOLIC PANEL: CPT

## 2024-11-05 PROCEDURE — 86850 RBC ANTIBODY SCREEN: CPT

## 2024-11-05 PROCEDURE — 83735 ASSAY OF MAGNESIUM: CPT

## 2024-11-05 PROCEDURE — 93926 LOWER EXTREMITY STUDY: CPT

## 2024-11-05 PROCEDURE — 85520 HEPARIN ASSAY: CPT

## 2024-11-05 RX ORDER — INSULIN LISPRO 100/ML
2 VIAL (ML) SUBCUTANEOUS
Refills: 0 | Status: DISCONTINUED | OUTPATIENT
Start: 2024-11-05 | End: 2024-11-05

## 2024-11-05 RX ORDER — DIBASIC SODIUM PHOSPHATE, MONOBASIC POTASSIUM PHOSPHATE AND MONOBASIC SODIUM PHOSPHATE 852; 155; 130 MG/1; MG/1; MG/1
1 TABLET ORAL ONCE
Refills: 0 | Status: COMPLETED | OUTPATIENT
Start: 2024-11-05 | End: 2024-11-05

## 2024-11-05 RX ORDER — MAGNESIUM, ALUMINUM HYDROXIDE 200-200 MG
30 TABLET,CHEWABLE ORAL EVERY 4 HOURS
Refills: 0 | Status: DISCONTINUED | OUTPATIENT
Start: 2024-11-05 | End: 2024-11-05

## 2024-11-05 RX ORDER — INSULIN LISPRO 100/ML
1 VIAL (ML) SUBCUTANEOUS
Refills: 0 | Status: DISCONTINUED | OUTPATIENT
Start: 2024-11-05 | End: 2024-11-05

## 2024-11-05 RX ORDER — MAGNESIUM SULFATE IN 0.9% NACL 2 G/50 ML
1 INTRAVENOUS SOLUTION, PIGGYBACK (ML) INTRAVENOUS ONCE
Refills: 0 | Status: COMPLETED | OUTPATIENT
Start: 2024-11-05 | End: 2024-11-05

## 2024-11-05 RX ORDER — INSULIN GLARGINE,HUM.REC.ANLOG 100/ML
4 VIAL (ML) SUBCUTANEOUS AT BEDTIME
Refills: 0 | Status: DISCONTINUED | OUTPATIENT
Start: 2024-11-05 | End: 2024-11-05

## 2024-11-05 RX ORDER — HEPARIN SODIUM 10000 [USP'U]/ML
1100 INJECTION INTRAVENOUS; SUBCUTANEOUS
Qty: 25000 | Refills: 0 | Status: DISCONTINUED | OUTPATIENT
Start: 2024-11-05 | End: 2024-11-05

## 2024-11-05 RX ORDER — INSULIN GLARGINE,HUM.REC.ANLOG 100/ML
3 VIAL (ML) SUBCUTANEOUS AT BEDTIME
Refills: 0 | Status: DISCONTINUED | OUTPATIENT
Start: 2024-11-05 | End: 2024-11-05

## 2024-11-05 RX ORDER — RIVAROXABAN 20 MG/1
20 TABLET, FILM COATED ORAL
Refills: 0 | Status: COMPLETED | OUTPATIENT
Start: 2024-11-05 | End: 2024-11-05

## 2024-11-05 RX ORDER — INSULIN LISPRO 100/ML
20 VIAL (ML) SUBCUTANEOUS ONCE
Refills: 0 | Status: COMPLETED | OUTPATIENT
Start: 2024-11-05 | End: 2024-11-05

## 2024-11-05 RX ORDER — INSULIN GLARGINE,HUM.REC.ANLOG 100/ML
15 VIAL (ML) SUBCUTANEOUS AT BEDTIME
Refills: 0 | Status: DISCONTINUED | OUTPATIENT
Start: 2024-11-05 | End: 2024-11-05

## 2024-11-05 RX ORDER — INSULIN LISPRO 100/ML
5 VIAL (ML) SUBCUTANEOUS
Refills: 0 | Status: DISCONTINUED | OUTPATIENT
Start: 2024-11-05 | End: 2024-11-05

## 2024-11-05 RX ADMIN — Medication 20 UNIT(S): at 12:53

## 2024-11-05 RX ADMIN — RIVAROXABAN 20 MILLIGRAM(S): 20 TABLET, FILM COATED ORAL at 18:36

## 2024-11-05 RX ADMIN — HEPARIN SODIUM 11 UNIT(S)/HR: 10000 INJECTION INTRAVENOUS; SUBCUTANEOUS at 14:05

## 2024-11-05 RX ADMIN — Medication 12: at 17:42

## 2024-11-05 RX ADMIN — FENOFIBRATE 145 MILLIGRAM(S): 145 TABLET, FILM COATED ORAL at 11:52

## 2024-11-05 RX ADMIN — Medication 4: at 07:21

## 2024-11-05 RX ADMIN — DIBASIC SODIUM PHOSPHATE, MONOBASIC POTASSIUM PHOSPHATE AND MONOBASIC SODIUM PHOSPHATE 1 PACKET(S): 852; 155; 130 TABLET ORAL at 02:44

## 2024-11-05 RX ADMIN — Medication 30 MILLILITER(S): at 02:44

## 2024-11-05 RX ADMIN — Medication 100 GRAM(S): at 02:44

## 2024-11-05 RX ADMIN — Medication 5 UNIT(S): at 17:43

## 2024-11-05 RX ADMIN — HEPARIN SODIUM 11 UNIT(S)/HR: 10000 INJECTION INTRAVENOUS; SUBCUTANEOUS at 07:13

## 2024-11-05 NOTE — DISCHARGE NOTE PROVIDER - HOSPITAL COURSE
As per HPI: "64 y.o. Male with PMHx of DMT2, HTN, persistent Afib (on Xarelto last dose 6/10/24), s/p cardioversion in June, HLD, BPH, SAYRA no longer on CPAP (secondary to 50 lb weight loss) presents to [facility] Dr [facility]. Pt was put on Holter monitor which showed Afib. Pt presents today for DCCV. Denies any SOB, ROWELL, CP, palpitations, dizziness, lightheadedness, syncope, presyncope, f/c, n/v/d, abd pain."    Hospital course:  Patient underwent successful RFA ablation on 11/4. Course c/b severe R groin bleeding, after removal of sheath. Pressure applied, fem stop placed, and patient admitted to CICU for close monitoring. Patient remains in NSR with HR in 80s. Fem stop removed by vascular surgery fellow at bedside. Patient underwent RLE duplex with small fluid collection, likely hematoma. No evidence of pseudoaneurysm. H/H, BP stable. Vascular surgery with no acute intervention. Patient placed back on hep gtt, with no acute bleeding, HD stable. Per EP okay to resume Xarelto - given dose prior to discharge. Patient also underwent PYP scan to evaluate for amyloid with findings not suggestive of amyloid. Outpatient f/u with Dr. Lozano on 12/17 at 10:30 AM. Patient voiced understanding and agreement with plan.     Discharge diagnoses:  Persistent Atrial fibrillation s/p radiofrequency ablation   T2DM  HTN  HLD  BPH  SAYRA not on CPAP therapy

## 2024-11-05 NOTE — PROGRESS NOTE ADULT - ASSESSMENT
64M PMH HTN, T2D, Morbid Obesity, S/P Sleeve Gastrectomy in 2013, SAYRA,AF s/p ablation via R fem vein access 11/4/2024 c/b persistent bleeding after pressure held when sheath removed. Pressure held. Appears to have ?perc close at time of exam given sutures around fem vein area. R groin appears to be hemostatic and without evidence of hematoma or PSA with fem stop off.     PLAN:   - F/u RLE arterial duplex in AM to r/o PSA/arterial access via veinous puncture  - Trend CBC  - Rest of care per primary team and CICU    Vascular surgery   [phone]

## 2024-11-05 NOTE — PROGRESS NOTE ADULT - PROBLEM SELECTOR PLAN 2
- Development of persistent R groin bleeding s/p RFA sheath removal, pressure applied and fem stop placed and subsequently removed, in CICU for close monitoring  - Stable for transfer to medical floors 11/5  - Seen by vascular surgery, with no acute intervention, no pseudoaneurysm on imaging  - RLE duplex 11/5 with small R groin fluid collection, per EP okay to resume Xarelto and dc home

## 2024-11-05 NOTE — CHART NOTE - NSCHARTNOTEFT_GEN_A_CORE
CCU Transfer Note    Transfer from: CCU    Transfer to: ( x ) Telemetry --    Accepting Physician:    Team covering on floor: ACP/Resident team   Signout given to: Hospitalist and     CCU COURSE:    HPI:  64 year old male PMH of HTN, T2D, Morbid Obesity, S/P Sleeve Gastrectomy in 2013, SAYRA, (states no longer uses CPAP since 50lb weight loss), and afib diagnosed 15 years ago (on xarelto) who presents to [facility] prior to AF Ablation on 11/4/2024. Patient endorses that he underwent S/P cardioversion in June. He denies SOB, ROWELL, chest pain, palpitations, dizziness, lightheadedness, syncope, presyncope, fever, chills, nausea, vomiting or abdominal pain.    Hospital Course: Patient is s/p afib ablation 11/4 and course was complicated by severe groin bleed from R groin after sheath removal. Fem stop placed. Patient admitted to CICU for close monitoring. Patient now in NSR with HR in 80s. Fem stop removed by vascular surgery fellow at 8:30pm. Suture removed. Groin remains soft this AM with c/d/i dressing. H/H remains stable.       FOR FOLLOW UP:  [] Per EP team, patient okay to restart heparin gtt this AM post ablation, restarted this AM with PTT due at 1300  [] Monitor groin site  [] F/u RLE arterial duplex, ordered  [] PYP scan to r/o amyloidosis, ordered       Vital Signs Last 24 Hrs  T(C): 36.7 (05 Nov 2024 07:00), Max: 36.8 (04 Nov 2024 18:30)  T(F): 98 (05 Nov 2024 07:00), Max: 98.3 (04 Nov 2024 18:30)  HR: 79 (05 Nov 2024 07:00) (72 - 91)  BP: 141/85 (05 Nov 2024 07:00) (113/58 - 157/97)  BP(mean): 108 (05 Nov 2024 07:00) (78 - 120)  RR: 16 (05 Nov 2024 07:00) (14 - 24)  SpO2: 95% (05 Nov 2024 07:00) (90% - 96%)    Parameters below as of 05 Nov 2024 07:00  Patient On (Oxygen Delivery Method): room air      MEDICATIONS  (STANDING):  atorvastatin 10 milliGRAM(s) Oral at bedtime  chlorhexidine 2% Cloths 1 Application(s) Topical daily  fenofibrate Tablet 145 milliGRAM(s) Oral daily  heparin  Infusion 1100 Unit(s)/Hr (11 mL/Hr) IV Continuous <Continuous>  influenza   Vaccine 0.5 milliLiter(s) IntraMuscular once  insulin lispro (ADMELOG) corrective regimen sliding scale   SubCutaneous at bedtime  insulin lispro (ADMELOG) corrective regimen sliding scale   SubCutaneous three times a day before meals  tamsulosin 0.4 milliGRAM(s) Oral at bedtime    MEDICATIONS  (PRN):  aluminum hydroxide/magnesium hydroxide/simethicone Suspension 30 milliLiter(s) Oral every 4 hours PRN Dyspepsia                  SILVANA Borrego PA-C CCU Transfer Note    Transfer from: CCU    Transfer to: ( x ) Telemetry --    Accepting Physician: Dr. Mann  Team covering on floor: ACP/Resident team   Signout given to: Hospitalist and     CCU COURSE:    HPI:  64 year old male PMH of HTN, T2D, Morbid Obesity, S/P Sleeve Gastrectomy in 2013, SAYRA, (states no longer uses CPAP since 50lb weight loss), and afib diagnosed 15 years ago (on xarelto) who presents to [facility] prior to AF Ablation on 11/4/2024. Patient endorses that he underwent S/P cardioversion in June. He denies SOB, ROWELL, chest pain, palpitations, dizziness, lightheadedness, syncope, presyncope, fever, chills, nausea, vomiting or abdominal pain.    Hospital Course: Patient is s/p afib ablation 11/4 and course was complicated by severe groin bleed from R groin after sheath removal. Fem stop placed. Patient admitted to CICU for close monitoring. Patient now in NSR with HR in 80s. Fem stop removed by vascular surgery fellow at 8:30pm. Suture removed. Groin remains soft this AM with c/d/i dressing. H/H remains stable.       FOR FOLLOW UP:  [] Follow up with EP regarding restarting heparin, would like to restart after DE  [] Monitor groin site  [] F/u RLE arterial duplex, ordered  [] PYP scan to r/o amyloidosis, ordered       Vital Signs Last 24 Hrs  T(C): 36.7 (05 Nov 2024 07:00), Max: 36.8 (04 Nov 2024 18:30)  T(F): 98 (05 Nov 2024 07:00), Max: 98.3 (04 Nov 2024 18:30)  HR: 79 (05 Nov 2024 07:00) (72 - 91)  BP: 141/85 (05 Nov 2024 07:00) (113/58 - 157/97)  BP(mean): 108 (05 Nov 2024 07:00) (78 - 120)  RR: 16 (05 Nov 2024 07:00) (14 - 24)  SpO2: 95% (05 Nov 2024 07:00) (90% - 96%)    Parameters below as of 05 Nov 2024 07:00  Patient On (Oxygen Delivery Method): room air      MEDICATIONS  (STANDING):  atorvastatin 10 milliGRAM(s) Oral at bedtime  chlorhexidine 2% Cloths 1 Application(s) Topical daily  fenofibrate Tablet 145 milliGRAM(s) Oral daily  heparin  Infusion 1100 Unit(s)/Hr (11 mL/Hr) IV Continuous <Continuous>  influenza   Vaccine 0.5 milliLiter(s) IntraMuscular once  insulin lispro (ADMELOG) corrective regimen sliding scale   SubCutaneous at bedtime  insulin lispro (ADMELOG) corrective regimen sliding scale   SubCutaneous three times a day before meals  tamsulosin 0.4 milliGRAM(s) Oral at bedtime    MEDICATIONS  (PRN):  aluminum hydroxide/magnesium hydroxide/simethicone Suspension 30 milliLiter(s) Oral every 4 hours PRN Dyspepsia                  SILVANA Borrego PA-C CCU Transfer Note    Transfer from: CCU    Transfer to: ( x ) Telemetry --    Accepting Physician: Dr. Mann  Team covering on floor: ACP/Resident team   Signout given to: Hospitalist and     CCU COURSE:    HPI:  64 year old male PMH of HTN, T2D, Morbid Obesity, S/P Sleeve Gastrectomy in 2013, SAYRA, (states no longer uses CPAP since 50lb weight loss), and afib diagnosed 15 years ago (on xarelto) who presents to [facility] prior to AF Ablation on 11/4/2024. Patient endorses that he underwent S/P cardioversion in June. He denies SOB, ROWELL, chest pain, palpitations, dizziness, lightheadedness, syncope, presyncope, fever, chills, nausea, vomiting or abdominal pain.    Hospital Course: Patient is s/p afib ablation 11/4 and course was complicated by severe groin bleed from R groin after sheath removal. Fem stop placed. Patient admitted to CICU for close monitoring. Patient now in NSR with HR in 80s. Fem stop removed by vascular surgery fellow at 8:30pm. Suture removed. Groin remains soft this AM with c/d/i dressing. H/H remains stable.       FOR FOLLOW UP:  [] Restarted heparin gtt this AM, f/u PTT  [] Monitor groin site  [] F/u RLE arterial duplex, ordered  [] PYP scan to r/o amyloidosis, ordered       Vital Signs Last 24 Hrs  T(C): 36.7 (05 Nov 2024 07:00), Max: 36.8 (04 Nov 2024 18:30)  T(F): 98 (05 Nov 2024 07:00), Max: 98.3 (04 Nov 2024 18:30)  HR: 79 (05 Nov 2024 07:00) (72 - 91)  BP: 141/85 (05 Nov 2024 07:00) (113/58 - 157/97)  BP(mean): 108 (05 Nov 2024 07:00) (78 - 120)  RR: 16 (05 Nov 2024 07:00) (14 - 24)  SpO2: 95% (05 Nov 2024 07:00) (90% - 96%)    Parameters below as of 05 Nov 2024 07:00  Patient On (Oxygen Delivery Method): room air      MEDICATIONS  (STANDING):  atorvastatin 10 milliGRAM(s) Oral at bedtime  chlorhexidine 2% Cloths 1 Application(s) Topical daily  fenofibrate Tablet 145 milliGRAM(s) Oral daily  heparin  Infusion 1100 Unit(s)/Hr (11 mL/Hr) IV Continuous <Continuous>  influenza   Vaccine 0.5 milliLiter(s) IntraMuscular once  insulin lispro (ADMELOG) corrective regimen sliding scale   SubCutaneous at bedtime  insulin lispro (ADMELOG) corrective regimen sliding scale   SubCutaneous three times a day before meals  tamsulosin 0.4 milliGRAM(s) Oral at bedtime    MEDICATIONS  (PRN):  aluminum hydroxide/magnesium hydroxide/simethicone Suspension 30 milliLiter(s) Oral every 4 hours PRN Dyspepsia                  SILVANA Borrego PA-C CCU Transfer Note    Transfer from: CCU    Transfer to: ( x ) Telemetry --    Accepting Physician: Dr. Mann  Team covering on floor: ABRAHAM Mariee (Spectra [id number])  Signout given to: Hospitalist and ABRAHAM (Jaylene [place]ra [id number])    CCU COURSE:    HPI:  64 year old male PMH of HTN, T2D, Morbid Obesity, S/P Sleeve Gastrectomy in 2013, SAYRA, (states no longer uses CPAP since 50lb weight loss), and afib diagnosed 15 years ago (on xarelto) who presents to [facility] prior to AF Ablation on 11/4/2024. Patient endorses that he underwent S/P cardioversion in June. He denies SOB, ROWELL, chest pain, palpitations, dizziness, lightheadedness, syncope, presyncope, fever, chills, nausea, vomiting or abdominal pain.    Hospital Course: Patient is s/p afib ablation 11/4 and course was complicated by severe groin bleed from R groin after sheath removal. Fem stop placed. Patient admitted to CICU for close monitoring. Patient now in NSR with HR in 80s. Fem stop removed by vascular surgery fellow at 8:30pm. Suture removed. Groin remains soft this AM with c/d/i dressing. H/H remains stable.       FOR FOLLOW UP:  [] Restarted heparin gtt this AM, f/u PTT  [] Monitor groin site  [] F/u RLE arterial duplex, ordered  [] PYP scan to r/o amyloidosis, ordered       Vital Signs Last 24 Hrs  T(C): 36.7 (05 Nov 2024 07:00), Max: 36.8 (04 Nov 2024 18:30)  T(F): 98 (05 Nov 2024 07:00), Max: 98.3 (04 Nov 2024 18:30)  HR: 79 (05 Nov 2024 07:00) (72 - 91)  BP: 141/85 (05 Nov 2024 07:00) (113/58 - 157/97)  BP(mean): 108 (05 Nov 2024 07:00) (78 - 120)  RR: 16 (05 Nov 2024 07:00) (14 - 24)  SpO2: 95% (05 Nov 2024 07:00) (90% - 96%)    Parameters below as of 05 Nov 2024 07:00  Patient On (Oxygen Delivery Method): room air      MEDICATIONS  (STANDING):  atorvastatin 10 milliGRAM(s) Oral at bedtime  chlorhexidine 2% Cloths 1 Application(s) Topical daily  fenofibrate Tablet 145 milliGRAM(s) Oral daily  heparin  Infusion 1100 Unit(s)/Hr (11 mL/Hr) IV Continuous <Continuous>  influenza   Vaccine 0.5 milliLiter(s) IntraMuscular once  insulin lispro (ADMELOG) corrective regimen sliding scale   SubCutaneous at bedtime  insulin lispro (ADMELOG) corrective regimen sliding scale   SubCutaneous three times a day before meals  tamsulosin 0.4 milliGRAM(s) Oral at bedtime    MEDICATIONS  (PRN):  aluminum hydroxide/magnesium hydroxide/simethicone Suspension 30 milliLiter(s) Oral every 4 hours PRN Dyspepsia                  SILVANA Borrego PA-C

## 2024-11-05 NOTE — DISCHARGE NOTE PROVIDER - NSDCFUADDAPPT_GEN_ALL_CORE_FT
APPTS ARE READY TO BE MADE: [X] YES    Best Family or Patient Contact (if needed):    Additional Information about above appointments (if needed):    1: PCP - Dr. Houston  2: Cardiology - Dr. Lozano (has appt 12/17)  3:     Other comments or requests:    APPTS ARE READY TO BE MADE: [X] YES    Best Family or Patient Contact (if needed):    Additional Information about above appointments (if needed):    1: PCP - Dr. Houston  2: Cardiology - Dr. Lozano (has appt 12/17)  3:     Other comments or requests:     Patient advised they did not want to proceed with scheduling appointments with the providers on their referrals. They will coordinate care on their own.

## 2024-11-05 NOTE — PROGRESS NOTE ADULT - ASSESSMENT
ASSESSMENT & PLAN:       ====================ASSESSMENT ======================  64M HTN, DM2, obesity s/p sleeve, Afib (Xarelto) p/f afib ablation c/b groin bleed.     Plan:  ====================== NEUROLOGY=====================  No active issues; A&Ox4  - neuro checks per CICU protocol    ==================== RESPIRATORY======================  No active issues  - SpO2 > 92% on RA    ====================CARDIOVASCULAR==================  # afib  - s/p ablation 11/4 c/b groin bleed s/p fem stop placement, now removed   - now in NSR in 80s  - hold AC until cleared     ===================HEMATOLOGIC/ONC ===================  # R groin bleed  - fem stop placed  - vascular surgery consulted, no acute surgical intervention  - F/u RLE arterial duplex to r/o PSA/arterial access via venous puncture   - trend CBC and transfuse for Hgb <7, plt <10k, <20k and febrile, or <50k and bleeding/pending invasive procedure  - hold AC until cleared     ===================== RENAL =========================  #BPH  - c/w flomax   - monitor SCr, UOP  - monitor electrolytes and replete for goal K 4-4.5 and Mg >2     ==================== GASTROINTESTINAL===================  No active issues  - c/w regular diet  - c/w bowel regimen    =======================    ENDOCRINE  =====================  #DM2  - c/w ISS. check FS before meals and at bedtime   - monitor blood glucose    ========================INFECTIOUS DISEASE================  No active issues; pt. afebrile and without leukocytosis  - monitor off abx     Dispo: Maintain in ICU.

## 2024-11-05 NOTE — PROGRESS NOTE ADULT - PROBLEM SELECTOR PLAN 1
- Dx 15 years prior, on Xarelto  - S/p cardioversion in June   - S/p successful RFA 11/4, remains in SR  - On hep gtt, per EP okay to d/c and resume home Xarelto  - F/u with Dr. Lozano 12/17 at 10:30 AM

## 2024-11-05 NOTE — PROGRESS NOTE ADULT - PROBLEM SELECTOR PLAN 5
[address]  Dept: [phone]    Patient:  Sharon Marrufo  :      1983  MRN:      [id number]    Referring Provider: Jhon Ruiz 04/29/2016   • LAP GASTRIC BYPASS/EMERSON-EN-Y  06/07/2021    Dr Dickson Johnson, [facility]   • REMOVAL GALLBLADDER         Family History:    Family History   Problem Relation Age of Onset   • Diabetes Mother    • Kidney Disease Mother    • Hypertension Mother Standing Expiration Date: 10/26/2022          Order Specific Question: Release to patient          Answer: Immediate      Vitamin B1 (Thiamine), Blood          Standing Status: Future          Standing Expiration Date: 10/26/2022          Order Specific Forrest Taylor - C/w home edarbyclor 40 mg-12.5 mg daily

## 2024-11-05 NOTE — DISCHARGE NOTE PROVIDER - NSDCFUSCHEDAPPT_GEN_ALL_CORE_FT
Michelet Lozano  [facility]  [address]  Scheduled Appointment: 12/17/2024

## 2024-11-05 NOTE — CONSULT NOTE ADULT - ASSESSMENT
64M PMH HTN, T2D, Morbid Obesity, S/P Sleeve Gastrectomy in 2013, SAYRA,AF s/p ablation via R fem vein access 11/4/2024 c/b persistent bleeding after pressure held when sheath removed. Pressure held. Appears to have ?perc close at time of exam given sutures around fem vein area. R groin appears to be hemostatic and without evidence of hematoma or PSA with fem stop off.     PLAN:   - No acute surgical intervention  - F/u RLE arterial duplex in AM to r/o PSA/arterial access via veinous puncture  - Ctm hemostasis, trend CBC  - Rest of care per primary team and CICU    Patient seen/examined with Dr. Sylvester Umaña on behalf of Dr. Fitch  Vascular surgery   [phone]  
#HTN  #T2D  #SAYRA (states no longer uses CPAP since 50lb weight loss)  #AF diagnosed 15 years ago (on xarelto) S/P Cardioversion in June, s/p rfa yesterday. Course is complicated by severe groin bleed from R groin after sheath removal. Fem stop placed. Patient admitted to CICU for close monitoring. Seen by vascular surgery overnight, no acute intervention, awaits sono femoral. Remains sr. To start heparin without bolus. Discussed with ccu team Dr Mcdermott.  Close follow up h/h.

## 2024-11-05 NOTE — PROGRESS NOTE ADULT - ASSESSMENT
64 year old Male with PMH HTN, T2DM, Morbid Obesity, S/P Sleeve Gastrectomy in 2013, SAYRA, AF s/p ablation via Right fem vein access 11/4/2024 with post sheath pull persistent bleeding. Pressure held.     1.  Persistent AFib s/p successful AFib ablation 11/4/24 with post sheath pull persistent bleeding.  Right groin stable without further bleeding    - Follow up Right LE arterial duplex this AM, vascular lab called to expedite   - H/H stable 13/42.5  - Okay to continue on IV Heparin without bolus for now.  Hold Xarelto for now   - Rest of care per primary team and SILVANA [facility]  [phone]  64 year old Male with PMH HTN, T2DM, Morbid Obesity, S/P Sleeve Gastrectomy in 2013, SAYRA, AF s/p ablation via Right fem vein access 11/4/2024 with post sheath pull persistent bleeding. Pressure held.     1.  Persistent AFib s/p successful AFib ablation 11/4/24 with post sheath pull persistent bleeding.  Right groin stable today without further bleeding    - Follow up Right LE arterial duplex this AM, vascular lab called to expedite   - H/H stable 13/42.5  - Okay to continue on IV Heparin without bolus for now.  Hold Xarelto for now   - Follow up PYP scan today to evaluate for Amyloid   - Rest of care per primary team and CICU  - EP Clinic follow up appointment with Dr. Lozano on 12/17 at 10:30am     BRIDGETTE Jones [facility]  [phone]     Addendum-  Doppler resulted with small right groin fluid collection, likely hematoma.  Okay to resume Xarelto today.  PYP scan not suggestive of Amyloid.  Patient is cleared by EP for discharge planning home follow up appointment with Dr. Lozano on 12/17 at 10:30am.  Above discussed with Primary Team.     BRIDGETTE Jones [facility]  [phone]

## 2024-11-05 NOTE — DISCHARGE NOTE NURSING/CASE MANAGEMENT/SOCIAL WORK - NSDCFUADDAPPT_GEN_ALL_CORE_FT
APPTS ARE READY TO BE MADE: [X] YES    Best Family or Patient Contact (if needed):    Additional Information about above appointments (if needed):    1: PCP - Dr. Houston  2: Cardiology - Dr. Lozano (has appt 12/17)  3:     Other comments or requests:

## 2024-11-05 NOTE — DISCHARGE NOTE PROVIDER - NSDCMRMEDTOKEN_GEN_ALL_CORE_FT
aspirin 81 mg oral tablet: orally once a day  atorvastatin 10 mg oral tablet: 1 tab(s) orally once a day  fenofibrate 134 mg oral capsule: 1 cap(s) orally once a day (with meals)  Jardiance 25 mg oral tablet: 1 tab(s) orally once a day  metFORMIN 1000 mg oral tablet: 1 tab(s) orally 2 times a day  metoprolol succinate 50 mg oral capsule, extended release: 1 cap(s) orally once a day (at bedtime)  Ozempic 2 mg/1.5 mL (0.25 mg or 0.5 mg dose) subcutaneous solution: 0.5 milligram(s) subcutaneously once a week on saturdays  Potassium Citrate: 15 milligram(s) 2 times a day  tamsulosin 0.4 mg oral capsule: 1 cap(s) orally once a day (at bedtime)  Xarelto 20 mg oral tablet: 1 tab(s) orally once a day

## 2024-11-05 NOTE — PROGRESS NOTE ADULT - ASSESSMENT
64M PMH HTN, T2DM, morbid obesity, s/p sleeve gastrectomy 2013, Afib on xarelto, SAYRA (not on CPAP since 50lb weight loss) s/p A-fib ablation. Hospitalization c/b severe bleed from R groin after sheath removal, s/p fem stop placement and removal. Seen by vascular surgery with no acute intervention

## 2024-11-05 NOTE — PROVIDER CONTACT NOTE (OTHER) - SITUATION
Patient with elevated blood sugar. Patient eating meals before obtaining fingerstick despite education.

## 2024-11-05 NOTE — DISCHARGE NOTE NURSING/CASE MANAGEMENT/SOCIAL WORK - NSDCPEFALRISK_GEN_ALL_CORE
For information on Fall & Injury Prevention, visit: [email or web address] OR  [email or web address] OR  https://www.cdc.gov/steadi/patient.html
No

## 2024-11-05 NOTE — DISCHARGE NOTE NURSING/CASE MANAGEMENT/SOCIAL WORK - PATIENT PORTAL LINK FT
You can access the FollowMyHealth Patient Portal offered by [facility] by registering at the following website: [email or web address]. By joining [employer]’s FollowMyHealth portal, you will also be able to view your health information using other applications (apps) compatible with our system.

## 2024-11-05 NOTE — DISCHARGE NOTE PROVIDER - NSDCCPTREATMENT_GEN_ALL_CORE_FT
PRINCIPAL PROCEDURE  Procedure: Radiofrequency ablation, arrhythmogenic focus, for atrial fibrillation  Findings and Treatment:

## 2024-11-05 NOTE — PROGRESS NOTE ADULT - SUBJECTIVE AND OBJECTIVE BOX
Vascular SURGERY DAILY PROGRESS NOTE    SUBJECTIVE: Patient seen and evaluated on AM rounds.     Overnight Events:  No acute events overnight  -----------------------------------------------------------------------------------------------------------------------------------------------------------------------------------------------------------  OBJECTIVE:  Vital Signs Last 24 Hrs  T(C): 36.7 (2024 07:00), Max: 36.8 (2024 18:30)  T(F): 98 (:00), Max: 98.3 (2024 18:30)  HR: 89 (:00) (72 - 91)  BP: 121/68 (:) (113/58 - 157/97)  BP(mean): 89 (:) (78 - 120)  RR: 17 (:) (14 - 24)  SpO2: 94% (:) (90% - 96%)    Parameters below as of   Patient On (Oxygen Delivery Method): room air      I&O's Detail    2024 07:01  -  2024 07:00  --------------------------------------------------------  IN:    Heparin: 11 mL    IV PiggyBack: 200 mL    Oral Fluid: 720 mL  Total IN: 931 mL    OUT:    Voided (mL): 550 mL  Total OUT: 550 mL    Total NET: 381 mL      2024 07:01  -  2024 10:22  --------------------------------------------------------  IN:    Heparin: 11 mL    Oral Fluid: 240 mL  Total IN: 251 mL    OUT:  Total OUT: 0 mL    Total NET: 251 mL        Daily Height in cm: 175.26 (2024 14:25)    Daily Weight in k.2 (2024 06:00)  MEDICATIONS  (STANDING):  atorvastatin 10 milliGRAM(s) Oral at bedtime  chlorhexidine 2% Cloths 1 Application(s) Topical daily  fenofibrate Tablet 145 milliGRAM(s) Oral daily  influenza   Vaccine 0.5 milliLiter(s) IntraMuscular once  insulin lispro (ADMELOG) corrective regimen sliding scale   SubCutaneous at bedtime  insulin lispro (ADMELOG) corrective regimen sliding scale   SubCutaneous three times a day before meals  tamsulosin 0.4 milliGRAM(s) Oral at bedtime    MEDICATIONS  (PRN):  aluminum hydroxide/magnesium hydroxide/simethicone Suspension 30 milliLiter(s) Oral every 4 hours PRN Dyspepsia      LABS:                        13.0   8.40  )-----------( 141      ( 2024 07:58 )             42.5         137  |  105  |  17  ----------------------------<  295[H]  4.1   |  18[L]  |  1.26    Ca    8.5      2024 00:10  Phos  2.3     11-  Mg     1.9     -    TPro  6.5  /  Alb  3.7  /  TBili  0.6  /  DBili  x   /  AST  42[H]  /  ALT  24  /  AlkPhos  52  11-05    PTT - ( 2024 18:56 )  PTT:30.3 sec  Urinalysis Basic - ( 2024 00:10 )    Color: x / Appearance: x / SG: x / pH: x  Gluc: 295 mg/dL / Ketone: x  / Bili: x / Urobili: x   Blood: x / Protein: x / Nitrite: x   Leuk Esterase: x / RBC: x / WBC x   Sq Epi: x / Non Sq Epi: x / Bacteria: x      PHYSICAL EXAM:    General: WN/WD NAD  Neurology: nonfocal, follows commands  Respiratory: nonlabored breathing on RA  CV: HDS  Extremities: WWP. R groin soft, no evidence of hematoma, R palpable DP/PT 
Debbie Frias PA-C  CICU PA  Contact via [employer]    CCU PROGRESS NOTE:    KEILY PERAZA  MRN-[id number]  Patient is a 64y old  Male who presents with a chief complaint of AFIB Ablation (23 Oct 2024 11:10)    INTERVAL HPI/OVERNIGHT EVENTS: No acute events overnight. H/H remains stable.    SUBJECTIVE: Patient seen and examined at bedside. No acute complaints. Denies chest pain, SOB, palpitations, dizziness/lightheadedness, LE edema.    MEDICATIONS  (STANDING):  atorvastatin 10 milliGRAM(s) Oral at bedtime  chlorhexidine 2% Cloths 1 Application(s) Topical daily  fenofibrate Tablet 145 milliGRAM(s) Oral daily  influenza   Vaccine 0.5 milliLiter(s) IntraMuscular once  insulin lispro (ADMELOG) corrective regimen sliding scale   SubCutaneous at bedtime  insulin lispro (ADMELOG) corrective regimen sliding scale   SubCutaneous three times a day before meals  tamsulosin 0.4 milliGRAM(s) Oral at bedtime    MEDICATIONS  (PRN):  aluminum hydroxide/magnesium hydroxide/simethicone Suspension 30 milliLiter(s) Oral every 4 hours PRN Dyspepsia    OBJECTIVE:  ICU Vital Signs Last 24 Hrs  T(C): 36.8 (05 Nov 2024 03:00), Max: 36.8 (04 Nov 2024 18:30)  T(F): 98.2 (05 Nov 2024 03:00), Max: 98.3 (04 Nov 2024 18:30)  HR: 84 (05 Nov 2024 06:00) (72 - 91)  BP: 150/86 (05 Nov 2024 06:00) (113/58 - 157/97)  BP(mean): 112 (05 Nov 2024 06:00) (78 - 120)  RR: 22 (05 Nov 2024 06:00) (14 - 24)  SpO2: 96% (05 Nov 2024 06:00) (90% - 96%)    O2 Parameters below as of 05 Nov 2024 06:00  Patient On (Oxygen Delivery Method): room air    I&O's Summary    04 Nov 2024 07:01  -  05 Nov 2024 06:43  --------------------------------------------------------  IN: 920 mL / OUT: 550 mL / NET: 370 mL      CAPILLARY BLOOD GLUCOSE    GENERAL: NAD, lying in bed comfortably  NECK: no JVD  HEART: S1, S2, regular rate and rhythm, no murmurs, rubs, or gallops  LUNGS: Unlabored respirations.  Clear to auscultation bilaterally, no crackles, wheezing, or rhonchi  ABDOMEN: Soft, nontender, nondistended, +BS  EXTREMITIES: 2+ peripheral pulses bilaterally. No clubbing, cyanosis, or edema    ============================I/O===========================   I&O's Detail    04 Nov 2024 07:01  -  05 Nov 2024 06:43  --------------------------------------------------------  IN:    IV PiggyBack: 200 mL    Oral Fluid: 720 mL  Total IN: 920 mL    OUT:    Voided (mL): 550 mL  Total OUT: 550 mL    Total NET: 370 mL    ============================ LABS =========================                        13.1   9.38  )-----------( 143      ( 05 Nov 2024 00:10 )             42.0     11-05    137  |  105  |  17  ----------------------------<  295[H]  4.1   |  18[L]  |  1.26    Ca    8.5      05 Nov 2024 00:10  Phos  2.3     11-05  Mg     1.9     11-05    TPro  6.5  /  Alb  3.7  /  TBili  0.6  /  DBili  x   /  AST  42[H]  /  ALT  24  /  AlkPhos  52  11-05    LIVER FUNCTIONS - ( 05 Nov 2024 00:10 )  Alb: 3.7 g/dL / Pro: 6.5 g/dL / ALK PHOS: 52 U/L / ALT: 24 U/L / AST: 42 U/L / GGT: x           PTT - ( 04 Nov 2024 18:56 )  PTT:30.3 sec      Urinalysis Basic - ( 05 Nov 2024 00:10 )    Color: x / Appearance: x / SG: x / pH: x  Gluc: 295 mg/dL / Ketone: x  / Bili: x / Urobili: x   Blood: x / Protein: x / Nitrite: x   Leuk Esterase: x / RBC: x / WBC x   Sq Epi: x / Non Sq Epi: x / Bacteria: x      ======================MICRO/RAD/CARDIO=================  Telemetry: Reviewed   EKG: Reviewed  CXR: Reviewed  Culture: Reviewed     
24H hour events: Patient resting comfortably in bed without complaints at this time.  Denies CP, palpitations or SOB. No abdominal or back pain.     MEDICATIONS:  heparin  Infusion 1100 Unit(s)/Hr IV Continuous <Continuous>    aluminum hydroxide/magnesium hydroxide/simethicone Suspension 30 milliLiter(s) Oral every 4 hours PRN    atorvastatin 10 milliGRAM(s) Oral at bedtime  fenofibrate Tablet 145 milliGRAM(s) Oral daily  insulin glargine Injectable (LANTUS) 4 Unit(s) SubCutaneous at bedtime  insulin lispro (ADMELOG) corrective regimen sliding scale   SubCutaneous at bedtime  insulin lispro (ADMELOG) corrective regimen sliding scale   SubCutaneous three times a day before meals  insulin lispro Injectable (ADMELOG) 2 Unit(s) SubCutaneous three times a day before meals    chlorhexidine 2% Cloths 1 Application(s) Topical daily  influenza   Vaccine 0.5 milliLiter(s) IntraMuscular once  tamsulosin 0.4 milliGRAM(s) Oral at bedtime      REVIEW OF SYSTEMS:  Complete 12point ROS negative.    PHYSICAL EXAM:  T(C): 36.8 (11-05-24 @ 11:00), Max: 36.8 (11-04-24 @ 18:30)  HR: 88 (11-05-24 @ 12:00) (72 - 92)  BP: 132/72 (11-05-24 @ 11:00) (113/58 - 157/97)  RR: 28 (11-05-24 @ 12:00) (14 - 28)  SpO2: 92% (11-05-24 @ 12:00) (90% - 96%)    04 Nov 2024 07:01  -  05 Nov 2024 07:00  --------------------------------------------------------  IN: 931 mL / OUT: 550 mL / NET: 381 mL    05 Nov 2024 07:01  -  05 Nov 2024 12:11  --------------------------------------------------------  IN: 491 mL / OUT: 0 mL / NET: 491 mL    Appearance: Normal	  HEENT:   Normal oral mucosa, PERRL, EOMI	  Cardiovascular: Normal S1 S2, regular. No JVD, No murmurs, No edema  Respiratory: Lungs clear to auscultation	  Psychiatry: A & O x 3, Mood & affect appropriate  Gastrointestinal:  Soft, Non-tender, + BS	  Skin: Right groin with ecchymosis, soft, no bruit auscultated   Extremities: Normal range of motion, No clubbing, cyanosis or edema  Vascular: Peripheral pulses palpable 2+ bilaterally      LABS:	 	    CBC Full  -  ( 05 Nov 2024 07:58 )  WBC Count : 8.40 K/uL  Hemoglobin : 13.0 g/dL  Hematocrit : 42.5 %  Platelet Count - Automated : 141 K/uL  Mean Cell Volume : 89.9 fl  Mean Cell Hemoglobin : 27.5 pg  Mean Cell Hemoglobin Concentration : 30.6 g/dL  Auto Neutrophil # : 6.00 K/uL  Auto Lymphocyte # : 1.44 K/uL  Auto Monocyte # : 0.82 K/uL  Auto Eosinophil # : 0.06 K/uL  Auto Basophil # : 0.04 K/uL  Auto Neutrophil % : 71.4 %  Auto Lymphocyte % : 17.1 %  Auto Monocyte % : 9.8 %  Auto Eosinophil % : 0.7 %  Auto Basophil % : 0.5 %    11-05    137  |  105  |  17  ----------------------------<  295[H]  4.1   |  18[L]  |  1.26  11-04    143  |  111[H]  |  14  ----------------------------<  234[H]  4.4   |  19[L]  |  1.18    Ca    8.5      05 Nov 2024 00:10  Ca    8.3[L]      04 Nov 2024 18:56  Phos  2.3     11-05  Phos  3.3     11-04  Mg     1.9     11-05  Mg     1.9     11-04    TPro  6.5  /  Alb  3.7  /  TBili  0.6  /  DBili  x   /  AST  42[H]  /  ALT  24  /  AlkPhos  52  11-05  TPro  6.8  /  Alb  3.8  /  TBili  0.9  /  DBili  x   /  AST  34  /  ALT  19  /  AlkPhos  57  11-04      TELEMETRY: 	  NSR 80's               
[facility] Division of Hospital Medicine  Armand Ferrara MD  Available on Teams [employer]ora    Patient is a 64y old  Male who presents with a chief complaint of AFIB Ablation (23 Oct 2024 11:10)      SUBJECTIVE / OVERNIGHT EVENTS:  Patient evaluated at bedside, reports feeling well; notes some ecchymosis to his right groin and states he feels fatigued but otherwise denies any CP, SOB or any other acute concerns.     ADDITIONAL REVIEW OF SYSTEMS: negative    MEDICATIONS  (STANDING):  atorvastatin 10 milliGRAM(s) Oral at bedtime  chlorhexidine 2% Cloths 1 Application(s) Topical daily  fenofibrate Tablet 145 milliGRAM(s) Oral daily  heparin  Infusion 1100 Unit(s)/Hr (11 mL/Hr) IV Continuous <Continuous>  influenza   Vaccine 0.5 milliLiter(s) IntraMuscular once  insulin glargine Injectable (LANTUS) 15 Unit(s) SubCutaneous at bedtime  insulin lispro (ADMELOG) corrective regimen sliding scale   SubCutaneous at bedtime  insulin lispro (ADMELOG) corrective regimen sliding scale   SubCutaneous three times a day before meals  insulin lispro Injectable (ADMELOG) 5 Unit(s) SubCutaneous three times a day before meals  tamsulosin 0.4 milliGRAM(s) Oral at bedtime    MEDICATIONS  (PRN):  aluminum hydroxide/magnesium hydroxide/simethicone Suspension 30 milliLiter(s) Oral every 4 hours PRN Dyspepsia      CAPILLARY BLOOD GLUCOSE      POCT Blood Glucose.: 296 mg/dL (05 Nov 2024 17:31)  POCT Blood Glucose.: 343 mg/dL (05 Nov 2024 14:13)  POCT Blood Glucose.: 316 mg/dL (05 Nov 2024 12:51)  POCT Blood Glucose.: 382 mg/dL (05 Nov 2024 11:01)  POCT Blood Glucose.: 172 mg/dL (05 Nov 2024 07:16)  POCT Blood Glucose.: 297 mg/dL (04 Nov 2024 21:19)  POCT Blood Glucose.: 211 mg/dL (04 Nov 2024 18:44)    I&O's Summary    04 Nov 2024 07:01  -  05 Nov 2024 07:00  --------------------------------------------------------  IN: 931 mL / OUT: 550 mL / NET: 381 mL    05 Nov 2024 07:01  -  05 Nov 2024 18:09  --------------------------------------------------------  IN: 753 mL / OUT: 0 mL / NET: 753 mL        PHYSICAL EXAM:  Vital Signs Last 24 Hrs  T(C): 37.3 (05 Nov 2024 17:01), Max: 37.3 (05 Nov 2024 17:01)  T(F): 99.1 (05 Nov 2024 17:01), Max: 99.1 (05 Nov 2024 17:01)  HR: 86 (05 Nov 2024 17:01) (78 - 94)  BP: 155/90 (05 Nov 2024 17:01) (113/58 - 157/97)  BP(mean): 109 (05 Nov 2024 15:00) (78 - 120)  RR: 20 (05 Nov 2024 17:01) (14 - 31)  SpO2: 92% (05 Nov 2024 17:01) (90% - 96%)    Parameters below as of 05 Nov 2024 17:01  Patient On (Oxygen Delivery Method): room air    CONSTITUTIONAL: Well-groomed, in no apparent distress  EYES: No conjunctival or scleral injection, non-icteric; PERRLA and symmetric  ENMT: No external nasal lesions; no pharyngeal injection or exudates, oral mucosa with moist membranes  NECK: Trachea midline without palpable neck mass; thyroid not enlarged and non-tender  RESPIRATORY: Breathing comfortably; lungs CTA without wheeze/rhonchi/rales  CARDIOVASCULAR: +S1S2, RRR, no M/G/R; pedal pulses full and symmetric  GASTROINTESTINAL: No palpable masses or tenderness, +BS throughout, no rebound/guarding  MUSCULOSKELETAL: no digital clubbing or cyanosis; RLE with ecchymosis around groin access site, slight tenderness to palpation  SKIN: No rashes or ulcers noted (apart from ecchymosis mentioned above)  NEUROLOGIC: CN II-XII intact; sensation intact in LEs b/l to light touch  PSYCHIATRIC: A+O x 3; mood and affect appropriate    LABS:                        13.0   8.40  )-----------( 141      ( 05 Nov 2024 07:58 )             42.5     11-05    137  |  105  |  17  ----------------------------<  295[H]  4.1   |  18[L]  |  1.26    Ca    8.5      05 Nov 2024 00:10  Phos  2.3     11-05  Mg     1.9     11-05    TPro  6.5  /  Alb  3.7  /  TBili  0.6  /  DBili  x   /  AST  42[H]  /  ALT  24  /  AlkPhos  52  11-05    PTT - ( 04 Nov 2024 18:56 )  PTT:30.3 sec      Urinalysis Basic - ( 05 Nov 2024 00:10 )    Color: x / Appearance: x / SG: x / pH: x  Gluc: 295 mg/dL / Ketone: x  / Bili: x / Urobili: x   Blood: x / Protein: x / Nitrite: x   Leuk Esterase: x / RBC: x / WBC x   Sq Epi: x / Non Sq Epi: x / Bacteria: x          RADIOLOGY & ADDITIONAL TESTS:  Results Reviewed:   Imaging Personally Reviewed:  Electrocardiogram Personally Reviewed:    COORDINATION OF CARE:  Care Discussed with Consultants/Other Providers [Y/N]:  Prior or Outpatient Records Reviewed [Y/N]:

## 2024-11-05 NOTE — CONSULT NOTE ADULT - SUBJECTIVE AND OBJECTIVE BOX
VASCULAR SURGERY CONSULT NOTE  --------------------------------------------------------------------------------------------  HPI:  64M PMH HTN, T2D, Morbid Obesity, S/P Sleeve Gastrectomy in 2013, SAYRA,AF s/p ablation via R fem vein access 11/4/2024 c/b persistent bleeding after pressure held when sheath removed. Pressure held. Fem stop in place per primary team who discussed with Dr. Delacruz from int cardiology. Appears to have ?perc close at time of exam given sutures around fem vein area.     Vascular surgery called given concern for possible arterial appearing bleeding at time of access and persistent bleeding requiring fem stop.     Patient denies fevers/chills, denies lightheadedness/dizziness, denies SOB/chest pain, denies nausea/vomiting, denies constipation/diarrhea.      ROS: 10-system review is otherwise negative except HPI above.      PAST MEDICAL & SURGICAL HISTORY:  Atrial fibrillation      Hypertension      Diabetes mellitus      Nephrolithiasis      Hypertriglyceridemia      Obesity (BMI 30-39.9)      Sleep apnea      Kidney calculus  2012 - left - surgical removal      S/P cataract surgery  2011 - bilateral eyes      H/O colonoscopy      H/O endoscopy      H/O lithotripsy      H/O right inguinal hernia repair      H/O circumcision      H/O gastric sleeve        FAMILY HISTORY:    [] Family history not pertinent as reviewed with the patient and family      ALLERGIES: No Known Allergies    CURRENT MEDICATIONS  MEDICATIONS (STANDING): atorvastatin 10 milliGRAM(s) Oral at bedtime  fenofibrate Tablet 145 milliGRAM(s) Oral daily  influenza   Vaccine 0.5 milliLiter(s) IntraMuscular once  insulin lispro (ADMELOG) corrective regimen sliding scale   SubCutaneous at bedtime  insulin lispro (ADMELOG) corrective regimen sliding scale   SubCutaneous three times a day before meals  tamsulosin 0.4 milliGRAM(s) Oral at bedtime    MEDICATIONS (PRN):aluminum hydroxide/magnesium hydroxide/simethicone Suspension 30 milliLiter(s) Oral every 4 hours PRN Dyspepsia    -------------------------------------------------------------------------------------------    Vitals:   T(C): 36.8 (11-05-24 @ 03:00), Max: 36.8 (11-04-24 @ 18:30)  HR: 78 (11-05-24 @ 05:00) (72 - 91)  BP: 137/80 (11-05-24 @ 05:00) (113/58 - 157/97)  RR: 15 (11-05-24 @ 05:00) (14 - 24)  SpO2: 92% (11-05-24 @ 05:00) (90% - 96%)  CAPILLARY BLOOD GLUCOSE      POCT Blood Glucose.: 297 mg/dL (04 Nov 2024 21:19)  POCT Blood Glucose.: 211 mg/dL (04 Nov 2024 18:44)  POCT Blood Glucose.: 142 mg/dL (04 Nov 2024 12:06)      11-04 @ 07:01  -  11-05 @ 05:38  --------------------------------------------------------  IN:    IV PiggyBack: 200 mL    Oral Fluid: 720 mL  Total IN: 920 mL    OUT:    Voided (mL): 550 mL  Total OUT: 550 mL    Total NET: 370 mL    Height (cm): 175.3 (11-04 @ 14:25)  Weight (kg): 95.3 (11-04 @ 14:25)  BMI (kg/m2): 31 (11-04 @ 14:25)  BSA (m2): 2.11 (11-04 @ 14:25)    PHYSICAL EXAM:   General: NAD, Lying in bed with mild R groin pain in setting of fem stop with pressure 135 mmHg  Neuro: Alert and answering questions appropriately   HEENT: Grossly normal, EOMI  Cardio: VSS, warm and perfusing  Resp: Good effort, no signs of respiratory distress  GI/Abd: Soft, NT/ND, no rebound/guarding, no masses palpated  Vascular: All 4 extremities warm, B/l radial pulses palpable, b/l DP/PT palpable. Fem stop taken down. L fem palp. R fem palp with tenderness to palpation and bruising. No pulsatile mass. No bleeding at time of exam.  Musculoskeletal: All 4 extremities moving spontaneously, no limitations    --------------------------------------------------------------------------------------------    LABS  CBC (11-05 @ 00:10)                              13.1                           9.38    )----------------(  143[L]     84.3[H]% Neutrophils, 6.6[L]% Lymphocytes, ANC: 7.90[H]                              42.0    CBC (11-04 @ 18:56)                              13.6                           10.57[H]  )----------------(  139[L]     92.2[H]% Neutrophils, 2.6[L]% Lymphocytes, ANC: 10.02[H]                              44.2      BMP (11-05 @ 00:10)             137     |  105     |  17    		Ca++ --      Ca 8.5                ---------------------------------( 295[H]		Mg 1.9                4.1     |  18[L]   |  1.26  			Ph 2.3[L]  BMP (11-04 @ 18:56)             143     |  111[H]  |  14    		Ca++ --      Ca 8.3[L]             ---------------------------------( 234[H]		Mg 1.9                4.4     |  19[L]   |  1.18  			Ph 3.3       LFTs (11-05 @ 00:10)      TPro 6.5 / Alb 3.7 / TBili 0.6 / DBili -- / AST 42[H] / ALT 24 / AlkPhos 52  LFTs (11-04 @ 18:56)      TPro 6.8 / Alb 3.8 / TBili 0.9 / DBili -- / AST 34 / ALT 19 / AlkPhos 57    Coags (11-04 @ 18:56)  aPTT 30.3 / INR -- / PT --    --------------------------------------------------------------------------------------------  MICROBIOLOGY  Urinalysis (11-05 @ 00:10):     Color:  / Appearance:  / SG:  / pH:  / Gluc: 295[H] / Ketones:  / Bili:  / Urobili:  / Protein : / Nitrites:  / Leuk.Est:  / RBC:  / WBC:  / Sq Epi:  / Non Sq Epi:  / Bacteria          --------------------------------------------------------------------------------------------  
    HPI:  64 year old male PMH of HTN, T2D, Morbid Obesity, S/P Sleeve Gastrectomy in 2013, SAYRA, (states no longer uses CPAP since 50lb weight loss), and  AF diagnosed 15 years ago (on xarelto) S/P Cardioversion in June, s/p rfa yesterday  He denies SOB, ROWELL, chest pain, palpitations, dizziness, lightheadedness, syncope, presyncope, fever, chills, nausea, vomiting or abdominal pain. Course is complicated by severe groin bleed from R groin after sheath removal. Fem stop placed. Patient admitted to CICU for close monitoring.  Seen by vascular surgery overnight, no acute intervention, awaits sono groin. Remains sr.       PAST MEDICAL & SURGICAL HISTORY:  Atrial fibrillation      Hypertension      Diabetes mellitus      Nephrolithiasis      Hypertriglyceridemia      Obesity (BMI 30-39.9)      Sleep apnea      Kidney calculus  2012 - left - surgical removal      S/P cataract surgery  2011 - bilateral eyes      H/O colonoscopy      H/O endoscopy      H/O lithotripsy      H/O right inguinal hernia repair      H/O circumcision      H/O gastric sleeve          Allergies    No Known Allergies        MEDICATIONS:  aluminum hydroxide/magnesium hydroxide/simethicone Suspension 30 milliLiter(s) Oral every 4 hours PRN  atorvastatin 10 milliGRAM(s) Oral at bedtime  chlorhexidine 2% Cloths 1 Application(s) Topical daily  fenofibrate Tablet 145 milliGRAM(s) Oral daily  influenza   Vaccine 0.5 milliLiter(s) IntraMuscular once  insulin lispro (ADMELOG) corrective regimen sliding scale   SubCutaneous at bedtime  insulin lispro (ADMELOG) corrective regimen sliding scale   SubCutaneous three times a day before meals  tamsulosin 0.4 milliGRAM(s) Oral at bedtime      REVIEW OF SYSTEMS:    CONSTITUTIONAL: No weakness, fevers or chills  EYES/ENT: No visual changes;  No vertigo or throat pain   NECK: No pain or stiffness  RESPIRATORY: No cough, wheezing, hemoptysis; No shortness of breath  CARDIOVASCULAR: No chest pain or palpitations  GASTROINTESTINAL: No abdominal or epigastric pain. No nausea, vomiting, or hematemesis; No diarrhea or constipation. No melena or hematochezia.  GENITOURINARY: No dysuria, frequency or hematuria  NEUROLOGICAL: No numbness or weakness  SKIN: No itching, burning, rashes, or lesions   All other review of systems is negative unless indicated above    Vital Signs Last 24 Hrs  T(C): 36.7 (05 Nov 2024 07:00), Max: 36.8 (04 Nov 2024 18:30)  T(F): 98 (05 Nov 2024 07:00), Max: 98.3 (04 Nov 2024 18:30)  HR: 89 (05 Nov 2024 09:00) (72 - 91)  BP: 121/68 (05 Nov 2024 09:00) (113/58 - 157/97)  BP(mean): 89 (05 Nov 2024 09:00) (78 - 120)  RR: 17 (05 Nov 2024 09:00) (14 - 24)  SpO2: 94% (05 Nov 2024 09:00) (90% - 96%)    Parameters below as of 05 Nov 2024 09:00  Patient On (Oxygen Delivery Method): room air        I&O's Summary    04 Nov 2024 07:01  -  05 Nov 2024 07:00  --------------------------------------------------------  IN: 931 mL / OUT: 550 mL / NET: 381 mL    05 Nov 2024 07:01  -  05 Nov 2024 10:32  --------------------------------------------------------  IN: 251 mL / OUT: 0 mL / NET: 251 mL        PHYSICAL EXAM:    Constitutional: NAD, awake and alert, well-developed  HEENT: PERR, EOMI  Neck: soft and supple, No LAD, No JVD  Respiratory: Breath sounds are clear bilaterally, No wheezing, rales or rhonchi  Cardiovascular: Regular rate and rhythm, normal S1 and S2,  no murmurs, gallops or rubs  Extremities: No peripheral edema. No clubbing or cyanosis.  Vascular: 2+ peripheral pulses      LABS: All Labs Reviewed:                        13.0   8.40  )-----------( 141      ( 05 Nov 2024 07:58 )             42.5                         13.1   9.38  )-----------( 143      ( 05 Nov 2024 00:10 )             42.0                         13.6   10.57 )-----------( 139      ( 04 Nov 2024 18:56 )             44.2     05 Nov 2024 00:10    137    |  105    |  17     ----------------------------<  295    4.1     |  18     |  1.26   04 Nov 2024 18:56    143    |  111    |  14     ----------------------------<  234    4.4     |  19     |  1.18     Ca    8.5        05 Nov 2024 00:10  Ca    8.3        04 Nov 2024 18:56  Phos  2.3       05 Nov 2024 00:10  Phos  3.3       04 Nov 2024 18:56  Mg     1.9       05 Nov 2024 00:10  Mg     1.9       04 Nov 2024 18:56    TPro  6.5    /  Alb  3.7    /  TBili  0.6    /  DBili  x      /  AST  42     /  ALT  24     /  AlkPhos  52     05 Nov 2024 00:10  TPro  6.8    /  Alb  3.8    /  TBili  0.9    /  DBili  x      /  AST  34     /  ALT  19     /  AlkPhos  57     04 Nov 2024 18:56    PTT - ( 04 Nov 2024 18:56 )  PTT:30.3 sec

## 2024-11-05 NOTE — DISCHARGE NOTE PROVIDER - CARE PROVIDER_API CALL
Sonal Houston  Internal Medicine  [address] [place] [address], [place] [address]  Phone: [phone]  Fax: [phone]  Follow Up Time: 1 week

## 2024-11-05 NOTE — PROGRESS NOTE ADULT - CRITICAL CARE ATTENDING COMMENT
S/p Afib ablation yesterday  Course complicated by femoral hematoma and concern for femoral injury  Transferred to CICU  A+Ox3  Hemodynamics acceptable, no pressors or inotropes  Femoral site has +hematoma that is not expanding with distal pulses intact  Vascular Surgery is following - will obtain femoral ultrasound  O2 sats mid 90s on room air  DASH/diabetic diet  Normal renal function, compensated on exam - target even  H/H acceptable - start Heparin drip for afib ablation with no bolus  Afebrile, no antibiotics  Sugars controlled  No central access S/p Afib ablation yesterday  Course complicated by femoral hematoma and concern for femoral injury  Transferred to CICU  A+Ox3  Hemodynamics acceptable, no pressors or inotropes  Femoral site has +hematoma that is not expanding with distal pulses intact  Vascular Surgery is following - will obtain femoral ultrasound  O2 sats mid 90s on room air  DASH/diabetic diet  Normal renal function, compensated on exam - target even  H/H acceptable - start Heparin drip for afib ablation with no bolus  Afebrile, no antibiotics  Sugars poorly controlled - increase Lantus  No central access

## 2024-11-05 NOTE — DISCHARGE NOTE NURSING/CASE MANAGEMENT/SOCIAL WORK - FINANCIAL ASSISTANCE
[facility] provides services at a reduced cost to those who are determined to be eligible through [facility]’s financial assistance program. Information regarding [facility]’s financial assistance program can be found by going to [email or web address] or by calling [phone].

## 2024-11-05 NOTE — DISCHARGE NOTE PROVIDER - ATTENDING DISCHARGE PHYSICAL EXAMINATION:
Patient evaluated at time of discharge. Resting comfortably in bed, agreeable to plan for discharge home.  General: Well-developed, resting comfortably in bed, obese  HEENT: NC, AT, moist mucus membranes  Cardiac: RRR  Lungs: CTA bilaterally  Abd: soft, nontender  MSK: No peripheral edema. R groin with stable ecchymosis, slight tenderness to palpation.  Neuro: A&OX3

## 2024-11-05 NOTE — PROGRESS NOTE ADULT - NSPROGADDITIONALINFOA_GEN_ALL_CORE
Discussed with patient and ACP - per EP pt with negative PYP scan, RLE duplex reviewed, okay for discharge home with Xarelto. O/P f/u with Dr. Lozano 12/17.

## 2024-12-17 ENCOUNTER — NON-APPOINTMENT (OUTPATIENT)
Age: 64
End: 2024-12-17

## 2024-12-17 ENCOUNTER — APPOINTMENT (OUTPATIENT)
Dept: ELECTROPHYSIOLOGY | Facility: CLINIC | Age: 64
End: 2024-12-17
Payer: COMMERCIAL

## 2024-12-17 VITALS
HEART RATE: 77 BPM | BODY MASS INDEX: 30.36 KG/M2 | SYSTOLIC BLOOD PRESSURE: 147 MMHG | DIASTOLIC BLOOD PRESSURE: 93 MMHG | OXYGEN SATURATION: 96 % | WEIGHT: 205 LBS | HEIGHT: 69 IN

## 2024-12-17 DIAGNOSIS — I48.91 UNSPECIFIED ATRIAL FIBRILLATION: ICD-10-CM

## 2024-12-17 PROCEDURE — 99213 OFFICE O/P EST LOW 20 MIN: CPT | Mod: 25

## 2024-12-17 PROCEDURE — 93000 ELECTROCARDIOGRAM COMPLETE: CPT

## 2025-01-28 NOTE — H&P PST ADULT - LAST ECHOCARDIOGRAM
LVM Relayed Msg Below to schedule a Virtual or In person visit to Discuss  [facility] Coordinator     states 1 year ago